# Patient Record
Sex: FEMALE | Race: WHITE | NOT HISPANIC OR LATINO | Employment: FULL TIME | URBAN - METROPOLITAN AREA
[De-identification: names, ages, dates, MRNs, and addresses within clinical notes are randomized per-mention and may not be internally consistent; named-entity substitution may affect disease eponyms.]

---

## 2019-04-24 LAB
ALT SERPL-CCNC: 46 U/L (ref 7–45)
AST SERPL-CCNC: 19 U/L
CHOLESTEROL (EXTERNAL): 121 MG/DL
HDLC SERPL-MCNC: 39 MG/DL (ref 40–59)
LDL CHOLESTEROL CALCULATED (EXTERNAL): 72 MG/DL (ref 0–130)
TRIGLYCERIDES (EXTERNAL): 49 MG/DL

## 2021-12-13 ENCOUNTER — TRANSFERRED RECORDS (OUTPATIENT)
Dept: HEALTH INFORMATION MANAGEMENT | Facility: CLINIC | Age: 21
End: 2021-12-13

## 2021-12-13 LAB
ALT SERPL-CCNC: 24 U/L (ref 7–45)
AST SERPL-CCNC: 23 U/L
CREATININE (EXTERNAL): 0.81 MG/DL (ref 0.5–1.39)
GFR ESTIMATED (EXTERNAL): >60 ML/MIN/1.73M2
GLUCOSE (EXTERNAL): 78 MG/DL (ref 60–199)
POTASSIUM (EXTERNAL): 4.2 MMOL/L (ref 3.6–5.1)

## 2022-01-01 ENCOUNTER — HOSPITAL ENCOUNTER (EMERGENCY)
Facility: CLINIC | Age: 22
End: 2022-01-01

## 2022-01-01 ENCOUNTER — APPOINTMENT (OUTPATIENT)
Dept: GENERAL RADIOLOGY | Facility: CLINIC | Age: 22
End: 2022-01-01
Attending: EMERGENCY MEDICINE

## 2022-01-01 ENCOUNTER — HOSPITAL ENCOUNTER (EMERGENCY)
Facility: CLINIC | Age: 22
Discharge: HOME | End: 2022-11-01
Attending: EMERGENCY MEDICINE | Admitting: EMERGENCY MEDICINE

## 2022-01-01 VITALS
OXYGEN SATURATION: 98 % | TEMPERATURE: 97.9 F | SYSTOLIC BLOOD PRESSURE: 155 MMHG | HEART RATE: 70 BPM | DIASTOLIC BLOOD PRESSURE: 94 MMHG | RESPIRATION RATE: 16 BRPM | BODY MASS INDEX: 29.16 KG/M2 | WEIGHT: 175 LBS | HEIGHT: 65 IN

## 2022-01-01 DIAGNOSIS — S89.91XA KNEE INJURY, RIGHT, INITIAL ENCOUNTER: ICD-10-CM

## 2022-01-01 DIAGNOSIS — S93.401A SPRAIN OF RIGHT ANKLE, UNSPECIFIED LIGAMENT, INITIAL ENCOUNTER: ICD-10-CM

## 2022-01-01 PROCEDURE — 73610 X-RAY EXAM OF ANKLE: CPT | Mod: RT

## 2022-01-01 PROCEDURE — 73562 X-RAY EXAM OF KNEE 3: CPT | Mod: RT

## 2022-01-01 PROCEDURE — 73630 X-RAY EXAM OF FOOT: CPT | Mod: RT

## 2022-01-01 PROCEDURE — 29505 APPLICATION LONG LEG SPLINT: CPT

## 2022-01-01 PROCEDURE — 99285 EMERGENCY DEPT VISIT HI MDM: CPT | Mod: 25

## 2022-01-01 ASSESSMENT — ENCOUNTER SYMPTOMS
COUGH: 0
FEVER: 0
SORE THROAT: 0

## 2022-11-01 NOTE — DISCHARGE INSTRUCTIONS
Wear knee immobilizer for 1 week. Use crutches to be help keep weight off this leg antil you follow up. Ice to knee/ankle 15 minutes every few hour. Tylenol and or ibuprofen for pain

## 2022-11-01 NOTE — ED PROVIDER NOTES
"  History   Chief Complaint:  Fall injury     HPI   Magy Garrett is a 22 year old male who presents with a fall injury. The patient reports that he was running in the woods last night when he fell into a hole and landed directly on his knees. He was intoxicated when this happens and did not notice the pain until this morning. He was having pain directly to his knee cap and the right side of his knee. He is also having right ankle pain and inner foot pain. He denies any other injuries from the fall. No fever, cough or sore throat. He just moved here from [place]. He quit smoking 3 days ago.    Review of Systems   Constitutional: Negative for fever.   HENT: Negative for sore throat.    Respiratory: Negative for cough.    Musculoskeletal:        + right ankle and knee pain   All other systems reviewed and are negative.      Allergies:  The patient has no known allergies.     Medications:  Testosterone     Past Medical History:     The patient denies past medical history.      Social History:  The patient presents to the ED alone  Living Situation: He just moved here from [place]  Alcohol Use: Endorses  Tobacco Use: Quit smoking 3 days ago   Drug Use: Endorses     Physical Exam     Patient Vitals for the past 24 hrs:   BP Temp Temp src Pulse Resp SpO2 Height Weight   11/01/22 1548 -- 97.9  F (36.6  C) Temporal -- -- -- -- --   11/01/22 1543 (!) 155/94 -- -- 70 16 98 % 1.651 m (5' 5\") 79.4 kg (175 lb)       Physical Exam    Physical Exam   Constitutional:  Patient is oriented to person, place, and time. They appear well-developed and well-nourished. Mild distress secondary to pain   HENT:   Mouth/Throat:   Wearing a face mask  Eyes:    Conjunctivae normal and EOM are normal. Pupils are equal, round, and reactive to light.   Neck:    Normal range of motion.   Cardiovascular: Normal rate, regular rhythm and normal heart sounds.  Exam reveals no gallop and no friction rub.  No murmur heard.  Pulmonary/Chest:  Effort " normal and breath sounds normal. Patient has no wheezes. Patient has no rales.   Musculoskeletal:  moderate right knee edema. No gross deformities. ACL and PCL are intact. Pain on the medial and lateral joint line to palpation. Painful ROM. Ankle pain on the right medial greater than lateral. No gross deformities. .   Neurological:   Patient is alert and oriented to person, place, and time. Patient has normal strength. No cranial nerve deficit or sensory deficit. GCS 15  Skin:   Skin is warm and dry. No rash noted. No erythema.   Psychiatric:   Patient has a normal mood and affect. Patient's behavior is normal. Judgment and thought content normal.         Emergency Department Course   Imaging:  Ankle XR, G/E 3 views, right   Final Result   IMPRESSION: Normal joint spaces and alignment. No fracture. The ankle mortise is congruent. The talar dome is unremarkable.      Foot  XR, G/E 3 views, right   Final Result   IMPRESSION: Normal joint spaces and alignment. No fracture.      XR Knee Right 3 Views   Final Result   IMPRESSION: Normal joint spaces and alignment. No fracture or joint effusion.        Report per radiology        Emergency Department Course:           Reviewed:  I reviewed nursing notes, vitals, past medical history and Care Everywhere    Assessments:  1549 I obtained history and examined the patient as noted above.   1706 I rechecked the patient and explained findings.     Interventions:  Knee immobilizer  Crutches     Disposition:  The patient was discharged to home.     Impression & Plan   Medical Decision Making:  Magy Beaulieu is a 22-year-old woman presenting to the emergency department with right knee and ankle foot injury after falling into a hole yesterday on Halloween.  He has moderate edema of the right knee but he does have normal flexion and extension.  He has pain to palpation around the knee joint itself but the ligament testing is intact.  I see no gross bony deformities of the knee foot  or ankle.  X-rays were obtained.  These are negative for fractures.  I suspect he probably had a meniscal injury to the knee.  I will place him in a knee immobilizer and crutches.  He most likely has an ankle sprain as well so the crutches will help alleviate the weight directly on this.  He will ice this every 3 hours and I will refer him back to primary care and Ortho for follow-up.          Diagnosis:    ICD-10-CM    1. Knee injury, right, initial encounter  S89.91XA       2. Sprain of right ankle, unspecified ligament, initial encounter  S93.401A           Discharge Medications:  There are no discharge medications for this patient.      Scribe Disclosure:  I, Malachi Laguerre, am serving as a scribe at 3:48 PM on 11/1/2022 to document services personally performed by Aidee Campbell MD based on my observations and the provider's statements to me.            Aidee Campbell MD  11/01/22 3168

## 2022-11-01 NOTE — ED TRIAGE NOTES
Pt running through the forest last night and fell and hit right knee and right foot. Pt present with unsteady gait with right leg.

## 2023-01-01 ENCOUNTER — OFFICE VISIT (OUTPATIENT)
Dept: FAMILY MEDICINE | Facility: CLINIC | Age: 23
End: 2023-01-01
Payer: MEDICAID

## 2023-01-01 ENCOUNTER — HOSPITAL ENCOUNTER (INPATIENT)
Facility: CLINIC | Age: 23
LOS: 10 days | Discharge: PSYCHIATRIC OR SUBSTANCE USE FACILITY | End: 2023-08-23
Attending: EMERGENCY MEDICINE | Admitting: INTERNAL MEDICINE
Payer: COMMERCIAL

## 2023-01-01 ENCOUNTER — APPOINTMENT (OUTPATIENT)
Dept: CARDIOLOGY | Facility: CLINIC | Age: 23
End: 2023-01-01
Attending: INTERNAL MEDICINE
Payer: COMMERCIAL

## 2023-01-01 ENCOUNTER — PATIENT OUTREACH (OUTPATIENT)
Dept: CARE COORDINATION | Facility: CLINIC | Age: 23
End: 2023-01-01
Payer: COMMERCIAL

## 2023-01-01 ENCOUNTER — HOSPITAL ENCOUNTER (EMERGENCY)
Facility: CLINIC | Age: 23
Discharge: HOME | End: 2023-06-10
Attending: EMERGENCY MEDICINE | Admitting: EMERGENCY MEDICINE
Payer: MEDICAID

## 2023-01-01 ENCOUNTER — APPOINTMENT (OUTPATIENT)
Dept: GENERAL RADIOLOGY | Facility: CLINIC | Age: 23
End: 2023-01-01
Payer: COMMERCIAL

## 2023-01-01 ENCOUNTER — HOSPITAL ENCOUNTER (OUTPATIENT)
Dept: NEUROLOGY | Facility: CLINIC | Age: 23
Setting detail: OBSERVATION
Discharge: HOME | End: 2023-08-12
Attending: INTERNAL MEDICINE
Payer: COMMERCIAL

## 2023-01-01 ENCOUNTER — APPOINTMENT (OUTPATIENT)
Dept: GENERAL RADIOLOGY | Facility: CLINIC | Age: 23
End: 2023-01-01
Attending: EMERGENCY MEDICINE
Payer: COMMERCIAL

## 2023-01-01 VITALS
DIASTOLIC BLOOD PRESSURE: 57 MMHG | HEART RATE: 72 BPM | HEIGHT: 62 IN | BODY MASS INDEX: 27.6 KG/M2 | OXYGEN SATURATION: 97 % | WEIGHT: 150 LBS | TEMPERATURE: 98.5 F | SYSTOLIC BLOOD PRESSURE: 124 MMHG | RESPIRATION RATE: 16 BRPM

## 2023-01-01 VITALS
TEMPERATURE: 98.2 F | OXYGEN SATURATION: 97 % | SYSTOLIC BLOOD PRESSURE: 126 MMHG | HEIGHT: 62 IN | WEIGHT: 142 LBS | BODY MASS INDEX: 26.13 KG/M2 | RESPIRATION RATE: 16 BRPM | HEART RATE: 63 BPM | DIASTOLIC BLOOD PRESSURE: 78 MMHG

## 2023-01-01 VITALS
HEART RATE: 50 BPM | BODY MASS INDEX: 24.61 KG/M2 | SYSTOLIC BLOOD PRESSURE: 114 MMHG | DIASTOLIC BLOOD PRESSURE: 53 MMHG | TEMPERATURE: 98.3 F | HEIGHT: 63 IN | RESPIRATION RATE: 15 BRPM | OXYGEN SATURATION: 98 % | WEIGHT: 138.89 LBS

## 2023-01-01 DIAGNOSIS — S26.91XA CONTUSION OF HEART, INITIAL ENCOUNTER: ICD-10-CM

## 2023-01-01 DIAGNOSIS — F64.0 TRANSGENDER PERSON ON HORMONE THERAPY: ICD-10-CM

## 2023-01-01 DIAGNOSIS — F31.81 BIPOLAR 2 DISORDER (H): ICD-10-CM

## 2023-01-01 DIAGNOSIS — Z79.899 TRANSGENDER PERSON ON HORMONE THERAPY: ICD-10-CM

## 2023-01-01 DIAGNOSIS — R45.851 SUICIDAL IDEATION: ICD-10-CM

## 2023-01-01 DIAGNOSIS — F64.9 GENDER DYSPHORIA: Primary | ICD-10-CM

## 2023-01-01 DIAGNOSIS — R07.89 MUSCULOSKELETAL CHEST PAIN: Primary | ICD-10-CM

## 2023-01-01 DIAGNOSIS — R79.89 ELEVATED TROPONIN: ICD-10-CM

## 2023-01-01 DIAGNOSIS — T40.604A OPIATE OVERDOSE, UNDETERMINED INTENT, INITIAL ENCOUNTER (H): ICD-10-CM

## 2023-01-01 DIAGNOSIS — Z11.59 NEED FOR HEPATITIS C SCREENING TEST: ICD-10-CM

## 2023-01-01 DIAGNOSIS — F43.20 EMOTIONAL CRISIS: ICD-10-CM

## 2023-01-01 DIAGNOSIS — Z11.4 SCREENING FOR HIV (HUMAN IMMUNODEFICIENCY VIRUS): ICD-10-CM

## 2023-01-01 LAB
ALBUMIN SERPL BCG-MCNC: 3.9 G/DL (ref 3.5–5.2)
ALBUMIN SERPL BCG-MCNC: 4.4 G/DL (ref 3.5–5.2)
ALBUMIN SERPL BCG-MCNC: 4.7 G/DL (ref 3.5–5.2)
ALBUMIN SERPL BCG-MCNC: 4.7 G/DL (ref 3.5–5.2)
ALBUMIN SERPL BCG-MCNC: 4.9 G/DL (ref 3.5–5.2)
ALP SERPL-CCNC: 104 U/L (ref 35–129)
ALP SERPL-CCNC: 115 U/L (ref 40–129)
ALP SERPL-CCNC: 125 U/L (ref 35–129)
ALP SERPL-CCNC: 127 U/L (ref 35–129)
ALT SERPL W P-5'-P-CCNC: 20 U/L (ref 0–70)
ALT SERPL W P-5'-P-CCNC: 26 U/L (ref 0–70)
ALT SERPL W P-5'-P-CCNC: 27 U/L (ref 0–70)
ALT SERPL W P-5'-P-CCNC: 29 U/L (ref 0–70)
AMPHETAMINES UR QL SCN: ABNORMAL
ANION GAP SERPL CALCULATED.3IONS-SCNC: 11 MMOL/L (ref 7–15)
ANION GAP SERPL CALCULATED.3IONS-SCNC: 12 MMOL/L (ref 7–15)
ANION GAP SERPL CALCULATED.3IONS-SCNC: 12 MMOL/L (ref 7–15)
ANION GAP SERPL CALCULATED.3IONS-SCNC: 15 MMOL/L (ref 7–15)
APAP SERPL-MCNC: <5 UG/ML (ref 10–30)
AST SERPL W P-5'-P-CCNC: 19 U/L (ref 0–45)
AST SERPL W P-5'-P-CCNC: 23 U/L (ref 0–45)
AST SERPL W P-5'-P-CCNC: 25 U/L (ref 0–45)
AST SERPL W P-5'-P-CCNC: 31 U/L (ref 0–45)
ATRIAL RATE - MUSE: 47 BPM
ATRIAL RATE - MUSE: 59 BPM
BARBITURATES UR QL SCN: ABNORMAL
BASE EXCESS BLDV CALC-SCNC: -5.3 MMOL/L (ref -7.7–1.9)
BASOPHILS # BLD AUTO: 0 10E3/UL (ref 0–0.2)
BASOPHILS # BLD AUTO: 0 10E3/UL (ref 0–0.2)
BASOPHILS # BLD AUTO: 0.1 10E3/UL (ref 0–0.2)
BASOPHILS NFR BLD AUTO: 0 %
BASOPHILS NFR BLD AUTO: 1 %
BASOPHILS NFR BLD AUTO: 1 %
BENZODIAZ UR QL SCN: ABNORMAL
BILIRUB DIRECT SERPL-MCNC: <0.2 MG/DL (ref 0–0.3)
BILIRUB SERPL-MCNC: 0.3 MG/DL
BILIRUB SERPL-MCNC: 0.4 MG/DL
BILIRUB SERPL-MCNC: 0.6 MG/DL
BILIRUB SERPL-MCNC: 0.8 MG/DL
BUN SERPL-MCNC: 10.5 MG/DL (ref 6–20)
BUN SERPL-MCNC: 11.4 MG/DL (ref 6–20)
BUN SERPL-MCNC: 12.4 MG/DL (ref 6–20)
BUN SERPL-MCNC: 13.5 MG/DL (ref 6–20)
BZE UR QL SCN: ABNORMAL
CALCIUM SERPL-MCNC: 8.8 MG/DL (ref 8.6–10)
CALCIUM SERPL-MCNC: 9.3 MG/DL (ref 8.6–10)
CALCIUM SERPL-MCNC: 9.3 MG/DL (ref 8.6–10)
CALCIUM SERPL-MCNC: 9.4 MG/DL (ref 8.6–10)
CANNABINOIDS UR QL SCN: ABNORMAL
CHLORIDE SERPL-SCNC: 105 MMOL/L (ref 98–107)
CHLORIDE SERPL-SCNC: 106 MMOL/L (ref 98–107)
CHLORIDE SERPL-SCNC: 109 MMOL/L (ref 98–107)
CHLORIDE SERPL-SCNC: 110 MMOL/L (ref 98–107)
CHOLEST SERPL-MCNC: 151 MG/DL
CREAT SERPL-MCNC: 1.06 MG/DL (ref 0.51–1.17)
CREAT SERPL-MCNC: 1.08 MG/DL (ref 0.51–1.17)
CREAT SERPL-MCNC: 1.08 MG/DL (ref 0.51–1.17)
CREAT SERPL-MCNC: 1.24 MG/DL (ref 0.51–1.17)
DEPRECATED HCO3 PLAS-SCNC: 18 MMOL/L (ref 22–29)
DEPRECATED HCO3 PLAS-SCNC: 20 MMOL/L (ref 22–29)
DEPRECATED HCO3 PLAS-SCNC: 21 MMOL/L (ref 22–29)
DEPRECATED HCO3 PLAS-SCNC: 23 MMOL/L (ref 22–29)
DIASTOLIC BLOOD PRESSURE - MUSE: NORMAL MMHG
DIASTOLIC BLOOD PRESSURE - MUSE: NORMAL MMHG
EOSINOPHIL # BLD AUTO: 0.1 10E3/UL (ref 0–0.7)
EOSINOPHIL # BLD AUTO: 0.2 10E3/UL (ref 0–0.7)
EOSINOPHIL # BLD AUTO: 0.2 10E3/UL (ref 0–0.7)
EOSINOPHIL NFR BLD AUTO: 1 %
EOSINOPHIL NFR BLD AUTO: 2 %
EOSINOPHIL NFR BLD AUTO: 2 %
ERYTHROCYTE [DISTWIDTH] IN BLOOD BY AUTOMATED COUNT: 15 % (ref 10–15)
ERYTHROCYTE [DISTWIDTH] IN BLOOD BY AUTOMATED COUNT: 15 % (ref 10–15)
ERYTHROCYTE [DISTWIDTH] IN BLOOD BY AUTOMATED COUNT: 15.2 % (ref 10–15)
ERYTHROCYTE [DISTWIDTH] IN BLOOD BY AUTOMATED COUNT: 15.3 % (ref 10–15)
ERYTHROCYTE [DISTWIDTH] IN BLOOD BY AUTOMATED COUNT: 15.4 % (ref 10–15)
ETHANOL SERPL-MCNC: <0.01 G/DL
FASTING STATUS PATIENT QL REPORTED: NORMAL
GFR SERPL CREATININE-BSD FRML MDRD: 62 ML/MIN/1.73M2
GFR SERPL CREATININE-BSD FRML MDRD: 73 ML/MIN/1.73M2
GFR SERPL CREATININE-BSD FRML MDRD: 73 ML/MIN/1.73M2
GFR SERPL CREATININE-BSD FRML MDRD: 74 ML/MIN/1.73M2
GLUCOSE BLDC GLUCOMTR-MCNC: 114 MG/DL (ref 70–99)
GLUCOSE BLDC GLUCOMTR-MCNC: 91 MG/DL (ref 70–99)
GLUCOSE BLDC GLUCOMTR-MCNC: 92 MG/DL (ref 70–99)
GLUCOSE BLDC GLUCOMTR-MCNC: 92 MG/DL (ref 70–99)
GLUCOSE SERPL-MCNC: 118 MG/DL (ref 70–99)
GLUCOSE SERPL-MCNC: 148 MG/DL (ref 70–99)
GLUCOSE SERPL-MCNC: 82 MG/DL (ref 70–99)
GLUCOSE SERPL-MCNC: 83 MG/DL (ref 70–99)
GLUCOSE SERPL-MCNC: 90 MG/DL (ref 70–99)
GLUCOSE SERPL-MCNC: 98 MG/DL (ref 70–99)
HCO3 BLDV-SCNC: 19 MMOL/L (ref 21–28)
HCT VFR BLD AUTO: 45.7 % (ref 35–53)
HCT VFR BLD AUTO: 48.5 % (ref 35–53)
HCT VFR BLD AUTO: 48.6 % (ref 35–53)
HCT VFR BLD AUTO: 49.3 % (ref 40–53)
HCT VFR BLD AUTO: 53.1 % (ref 35–53)
HCV AB SERPL QL IA: NONREACTIVE
HDLC SERPL-MCNC: 26 MG/DL
HGB BLD-MCNC: 15.2 G/DL (ref 11.7–17.7)
HGB BLD-MCNC: 16.2 G/DL (ref 11.7–17.7)
HGB BLD-MCNC: 16.2 G/DL (ref 13.3–17.7)
HGB BLD-MCNC: 16.3 G/DL (ref 11.7–17.7)
HGB BLD-MCNC: 17.8 G/DL (ref 11.7–17.7)
HIV 1+2 AB+HIV1 P24 AG SERPL QL IA: NONREACTIVE
HOLD SPECIMEN: NORMAL
IMM GRANULOCYTES # BLD: 0 10E3/UL
IMM GRANULOCYTES NFR BLD: 0 %
INTERPRETATION ECG - MUSE: NORMAL
INTERPRETATION ECG - MUSE: NORMAL
LAMOTRIGINE SERPL-MCNC: 2 UG/ML
LDLC SERPL CALC-MCNC: 98 MG/DL
LYMPHOCYTES # BLD AUTO: 1.1 10E3/UL (ref 0.8–5.3)
LYMPHOCYTES # BLD AUTO: 1.8 10E3/UL (ref 0.8–5.3)
LYMPHOCYTES # BLD AUTO: 2.5 10E3/UL (ref 0.8–5.3)
LYMPHOCYTES NFR BLD AUTO: 11 %
LYMPHOCYTES NFR BLD AUTO: 21 %
LYMPHOCYTES NFR BLD AUTO: 23 %
MAGNESIUM SERPL-MCNC: 2 MG/DL (ref 1.7–2.3)
MCH RBC QN AUTO: 29.9 PG (ref 26.5–33)
MCH RBC QN AUTO: 30.5 PG (ref 26.5–33)
MCH RBC QN AUTO: 30.8 PG (ref 26.5–33)
MCH RBC QN AUTO: 30.8 PG (ref 26.5–33)
MCH RBC QN AUTO: 31.2 PG (ref 26.5–33)
MCHC RBC AUTO-ENTMCNC: 32.9 G/DL (ref 31.5–36.5)
MCHC RBC AUTO-ENTMCNC: 33.3 G/DL (ref 31.5–36.5)
MCHC RBC AUTO-ENTMCNC: 33.3 G/DL (ref 31.5–36.5)
MCHC RBC AUTO-ENTMCNC: 33.5 G/DL (ref 31.5–36.5)
MCHC RBC AUTO-ENTMCNC: 33.6 G/DL (ref 31.5–36.5)
MCV RBC AUTO: 91 FL (ref 78–100)
MCV RBC AUTO: 92 FL (ref 78–100)
MCV RBC AUTO: 92 FL (ref 78–100)
MCV RBC AUTO: 93 FL (ref 78–100)
MCV RBC AUTO: 93 FL (ref 78–100)
MONOCYTES # BLD AUTO: 0.5 10E3/UL (ref 0–1.3)
MONOCYTES # BLD AUTO: 0.6 10E3/UL (ref 0–1.3)
MONOCYTES # BLD AUTO: 0.7 10E3/UL (ref 0–1.3)
MONOCYTES NFR BLD AUTO: 5 %
MONOCYTES NFR BLD AUTO: 6 %
MONOCYTES NFR BLD AUTO: 7 %
NEUTROPHILS # BLD AUTO: 5.8 10E3/UL (ref 1.6–8.3)
NEUTROPHILS # BLD AUTO: 7.2 10E3/UL (ref 1.6–8.3)
NEUTROPHILS # BLD AUTO: 8.7 10E3/UL (ref 1.6–8.3)
NEUTROPHILS NFR BLD AUTO: 67 %
NEUTROPHILS NFR BLD AUTO: 70 %
NEUTROPHILS NFR BLD AUTO: 82 %
NONHDLC SERPL-MCNC: 125 MG/DL
NRBC # BLD AUTO: 0 10E3/UL
NRBC # BLD AUTO: 0 10E3/UL
NRBC BLD AUTO-RTO: 0 /100
NRBC BLD AUTO-RTO: 0 /100
O2/TOTAL GAS SETTING VFR VENT: 0 %
OPIATES UR QL SCN: ABNORMAL
P AXIS - MUSE: 56 DEGREES
P AXIS - MUSE: 65 DEGREES
PCO2 BLDV: 33 MM HG (ref 40–50)
PCP QUAL URINE (ROCHE): ABNORMAL
PH BLDV: 7.37 [PH] (ref 7.32–7.43)
PHOSPHATE SERPL-MCNC: 1.9 MG/DL (ref 2.5–4.5)
PHOSPHATE SERPL-MCNC: 2 MG/DL (ref 2.5–4.5)
PHOSPHATE SERPL-MCNC: 2 MG/DL (ref 2.5–4.5)
PHOSPHATE SERPL-MCNC: 2.1 MG/DL (ref 2.5–4.5)
PHOSPHATE SERPL-MCNC: 2.2 MG/DL (ref 2.5–4.5)
PHOSPHATE SERPL-MCNC: 2.6 MG/DL (ref 2.5–4.5)
PHOSPHATE SERPL-MCNC: 2.7 MG/DL (ref 2.5–4.5)
PHOSPHATE SERPL-MCNC: 3.9 MG/DL (ref 2.5–4.5)
PLATELET # BLD AUTO: 304 10E3/UL (ref 150–450)
PLATELET # BLD AUTO: 317 10E3/UL (ref 150–450)
PLATELET # BLD AUTO: 333 10E3/UL (ref 150–450)
PLATELET # BLD AUTO: 362 10E3/UL (ref 150–450)
PLATELET # BLD AUTO: 433 10E3/UL (ref 150–450)
PO2 BLDV: 87 MM HG (ref 25–47)
POTASSIUM SERPL-SCNC: 3.5 MMOL/L (ref 3.4–5.3)
POTASSIUM SERPL-SCNC: 4 MMOL/L (ref 3.4–5.3)
POTASSIUM SERPL-SCNC: 4 MMOL/L (ref 3.4–5.3)
POTASSIUM SERPL-SCNC: 4.2 MMOL/L (ref 3.4–5.3)
PR INTERVAL - MUSE: 154 MS
PR INTERVAL - MUSE: 166 MS
PROT SERPL-MCNC: 6.3 G/DL (ref 6.4–8.3)
PROT SERPL-MCNC: 7.5 G/DL (ref 6.4–8.3)
PROT SERPL-MCNC: 7.8 G/DL (ref 6.4–8.3)
PROT SERPL-MCNC: 8 G/DL (ref 6.4–8.3)
QRS DURATION - MUSE: 102 MS
QRS DURATION - MUSE: 96 MS
QT - MUSE: 372 MS
QT - MUSE: 412 MS
QTC - MUSE: 364 MS
QTC - MUSE: 368 MS
R AXIS - MUSE: 69 DEGREES
R AXIS - MUSE: 73 DEGREES
RBC # BLD AUTO: 4.94 10E6/UL (ref 3.8–5.9)
RBC # BLD AUTO: 5.3 10E6/UL (ref 3.8–5.9)
RBC # BLD AUTO: 5.31 10E6/UL (ref 3.8–5.9)
RBC # BLD AUTO: 5.41 10E6/UL (ref 4.4–5.9)
RBC # BLD AUTO: 5.7 10E6/UL (ref 3.8–5.9)
SALICYLATES SERPL-MCNC: <0.3 MG/DL
SARS-COV-2 RNA RESP QL NAA+PROBE: NEGATIVE
SODIUM SERPL-SCNC: 140 MMOL/L (ref 136–145)
SODIUM SERPL-SCNC: 140 MMOL/L (ref 136–145)
SODIUM SERPL-SCNC: 141 MMOL/L (ref 136–145)
SODIUM SERPL-SCNC: 141 MMOL/L (ref 136–145)
SYSTOLIC BLOOD PRESSURE - MUSE: NORMAL MMHG
SYSTOLIC BLOOD PRESSURE - MUSE: NORMAL MMHG
T AXIS - MUSE: 49 DEGREES
T AXIS - MUSE: 60 DEGREES
TESTOST SERPL-MCNC: 3211 NG/DL (ref 240–950)
TOPIRAMATE SERPL-MCNC: 5.6 UG/ML
TOPIRAMATE SERPL-MCNC: 6.4 UG/ML
TRIGL SERPL-MCNC: 134 MG/DL
TROPONIN T SERPL HS-MCNC: 11 NG/L
TROPONIN T SERPL HS-MCNC: 19 NG/L
TROPONIN T SERPL HS-MCNC: 46 NG/L
TROPONIN T SERPL HS-MCNC: 68 NG/L
VENTRICULAR RATE- MUSE: 47 BPM
VENTRICULAR RATE- MUSE: 59 BPM
WBC # BLD AUTO: 10.5 10E3/UL (ref 4–11)
WBC # BLD AUTO: 10.7 10E3/UL (ref 4–11)
WBC # BLD AUTO: 11.1 10E3/UL (ref 4–11)
WBC # BLD AUTO: 7.7 10E3/UL (ref 4–11)
WBC # BLD AUTO: 8.3 10E3/UL (ref 4–11)

## 2023-01-01 PROCEDURE — 250N000013 HC RX MED GY IP 250 OP 250 PS 637: Performed by: INTERNAL MEDICINE

## 2023-01-01 PROCEDURE — 999N000052 EEG VIDEO 2-12 HRS UNMONITORED

## 2023-01-01 PROCEDURE — 71046 X-RAY EXAM CHEST 2 VIEWS: CPT

## 2023-01-01 PROCEDURE — 84403 ASSAY OF TOTAL TESTOSTERONE: CPT | Mod: KX | Performed by: STUDENT IN AN ORGANIZED HEALTH CARE EDUCATION/TRAINING PROGRAM

## 2023-01-01 PROCEDURE — 36415 COLL VENOUS BLD VENIPUNCTURE: CPT | Performed by: HOSPITALIST

## 2023-01-01 PROCEDURE — 99285 EMERGENCY DEPT VISIT HI MDM: CPT | Mod: 25

## 2023-01-01 PROCEDURE — 84484 ASSAY OF TROPONIN QUANT: CPT | Performed by: EMERGENCY MEDICINE

## 2023-01-01 PROCEDURE — 84100 ASSAY OF PHOSPHORUS: CPT | Performed by: HOSPITALIST

## 2023-01-01 PROCEDURE — 96375 TX/PRO/DX INJ NEW DRUG ADDON: CPT

## 2023-01-01 PROCEDURE — G0378 HOSPITAL OBSERVATION PER HR: HCPCS

## 2023-01-01 PROCEDURE — 250N000011 HC RX IP 250 OP 636: Mod: JZ | Performed by: HOSPITALIST

## 2023-01-01 PROCEDURE — 82947 ASSAY GLUCOSE BLOOD QUANT: CPT | Mod: KX | Performed by: STUDENT IN AN ORGANIZED HEALTH CARE EDUCATION/TRAINING PROGRAM

## 2023-01-01 PROCEDURE — 120N000001 HC R&B MED SURG/OB

## 2023-01-01 PROCEDURE — 250N000013 HC RX MED GY IP 250 OP 250 PS 637: Performed by: HOSPITALIST

## 2023-01-01 PROCEDURE — 36415 COLL VENOUS BLD VENIPUNCTURE: CPT | Performed by: PSYCHIATRY & NEUROLOGY

## 2023-01-01 PROCEDURE — 96361 HYDRATE IV INFUSION ADD-ON: CPT

## 2023-01-01 PROCEDURE — 80143 DRUG ASSAY ACETAMINOPHEN: CPT | Performed by: EMERGENCY MEDICINE

## 2023-01-01 PROCEDURE — 250N000011 HC RX IP 250 OP 636: Performed by: EMERGENCY MEDICINE

## 2023-01-01 PROCEDURE — H0001 ALCOHOL AND/OR DRUG ASSESS: HCPCS

## 2023-01-01 PROCEDURE — 80053 COMPREHEN METABOLIC PANEL: CPT | Performed by: HOSPITALIST

## 2023-01-01 PROCEDURE — 250N000011 HC RX IP 250 OP 636: Performed by: INTERNAL MEDICINE

## 2023-01-01 PROCEDURE — 99232 SBSQ HOSP IP/OBS MODERATE 35: CPT | Performed by: INTERNAL MEDICINE

## 2023-01-01 PROCEDURE — 82962 GLUCOSE BLOOD TEST: CPT

## 2023-01-01 PROCEDURE — 96376 TX/PRO/DX INJ SAME DRUG ADON: CPT

## 2023-01-01 PROCEDURE — 99291 CRITICAL CARE FIRST HOUR: CPT | Performed by: INTERNAL MEDICINE

## 2023-01-01 PROCEDURE — 99239 HOSP IP/OBS DSCHRG MGMT >30: CPT | Performed by: INTERNAL MEDICINE

## 2023-01-01 PROCEDURE — 82947 ASSAY GLUCOSE BLOOD QUANT: CPT | Performed by: INTERNAL MEDICINE

## 2023-01-01 PROCEDURE — 80201 ASSAY OF TOPIRAMATE: CPT | Performed by: INTERNAL MEDICINE

## 2023-01-01 PROCEDURE — 90791 PSYCH DIAGNOSTIC EVALUATION: CPT

## 2023-01-01 PROCEDURE — 87635 SARS-COV-2 COVID-19 AMP PRB: CPT | Performed by: EMERGENCY MEDICINE

## 2023-01-01 PROCEDURE — 80069 RENAL FUNCTION PANEL: CPT | Performed by: INTERNAL MEDICINE

## 2023-01-01 PROCEDURE — 93306 TTE W/DOPPLER COMPLETE: CPT | Mod: 26 | Performed by: INTERNAL MEDICINE

## 2023-01-01 PROCEDURE — 84100 ASSAY OF PHOSPHORUS: CPT | Performed by: INTERNAL MEDICINE

## 2023-01-01 PROCEDURE — 250N000011 HC RX IP 250 OP 636: Mod: JZ | Performed by: INTERNAL MEDICINE

## 2023-01-01 PROCEDURE — 99204 OFFICE O/P NEW MOD 45 MIN: CPT | Mod: GC | Performed by: STUDENT IN AN ORGANIZED HEALTH CARE EDUCATION/TRAINING PROGRAM

## 2023-01-01 PROCEDURE — 84484 ASSAY OF TROPONIN QUANT: CPT

## 2023-01-01 PROCEDURE — 99232 SBSQ HOSP IP/OBS MODERATE 35: CPT | Performed by: HOSPITALIST

## 2023-01-01 PROCEDURE — 258N000003 HC RX IP 258 OP 636: Performed by: EMERGENCY MEDICINE

## 2023-01-01 PROCEDURE — 93010 ELECTROCARDIOGRAM REPORT: CPT | Performed by: INTERNAL MEDICINE

## 2023-01-01 PROCEDURE — 80053 COMPREHEN METABOLIC PANEL: CPT | Performed by: EMERGENCY MEDICINE

## 2023-01-01 PROCEDURE — 93306 TTE W/DOPPLER COMPLETE: CPT

## 2023-01-01 PROCEDURE — 99223 1ST HOSP IP/OBS HIGH 75: CPT | Mod: AI | Performed by: INTERNAL MEDICINE

## 2023-01-01 PROCEDURE — 99254 IP/OBS CNSLTJ NEW/EST MOD 60: CPT | Performed by: PSYCHIATRY & NEUROLOGY

## 2023-01-01 PROCEDURE — 250N000009 HC RX 250: Performed by: HOSPITALIST

## 2023-01-01 PROCEDURE — 85027 COMPLETE CBC AUTOMATED: CPT | Performed by: HOSPITALIST

## 2023-01-01 PROCEDURE — 85025 COMPLETE CBC W/AUTO DIFF WBC: CPT | Performed by: EMERGENCY MEDICINE

## 2023-01-01 PROCEDURE — 36415 COLL VENOUS BLD VENIPUNCTURE: CPT | Performed by: INTERNAL MEDICINE

## 2023-01-01 PROCEDURE — 36415 COLL VENOUS BLD VENIPUNCTURE: CPT

## 2023-01-01 PROCEDURE — 82077 ASSAY SPEC XCP UR&BREATH IA: CPT | Performed by: EMERGENCY MEDICINE

## 2023-01-01 PROCEDURE — 258N000003 HC RX IP 258 OP 636: Performed by: INTERNAL MEDICINE

## 2023-01-01 PROCEDURE — 99207 PR NO BILLABLE SERVICE THIS VISIT: CPT | Performed by: INTERNAL MEDICINE

## 2023-01-01 PROCEDURE — 250N000009 HC RX 250: Performed by: INTERNAL MEDICINE

## 2023-01-01 PROCEDURE — 85027 COMPLETE CBC AUTOMATED: CPT | Performed by: INTERNAL MEDICINE

## 2023-01-01 PROCEDURE — 80307 DRUG TEST PRSMV CHEM ANLYZR: CPT | Performed by: INTERNAL MEDICINE

## 2023-01-01 PROCEDURE — 99233 SBSQ HOSP IP/OBS HIGH 50: CPT | Performed by: INTERNAL MEDICINE

## 2023-01-01 PROCEDURE — 99233 SBSQ HOSP IP/OBS HIGH 50: CPT | Performed by: PSYCHIATRY & NEUROLOGY

## 2023-01-01 PROCEDURE — 36415 COLL VENOUS BLD VENIPUNCTURE: CPT | Performed by: EMERGENCY MEDICINE

## 2023-01-01 PROCEDURE — 85025 COMPLETE CBC W/AUTO DIFF WBC: CPT | Performed by: INTERNAL MEDICINE

## 2023-01-01 PROCEDURE — 80076 HEPATIC FUNCTION PANEL: CPT | Mod: KX | Performed by: STUDENT IN AN ORGANIZED HEALTH CARE EDUCATION/TRAINING PROGRAM

## 2023-01-01 PROCEDURE — 96374 THER/PROPH/DIAG INJ IV PUSH: CPT

## 2023-01-01 PROCEDURE — 84484 ASSAY OF TROPONIN QUANT: CPT | Performed by: INTERNAL MEDICINE

## 2023-01-01 PROCEDURE — 86803 HEPATITIS C AB TEST: CPT | Mod: KX | Performed by: STUDENT IN AN ORGANIZED HEALTH CARE EDUCATION/TRAINING PROGRAM

## 2023-01-01 PROCEDURE — 80175 DRUG SCREEN QUAN LAMOTRIGINE: CPT | Performed by: EMERGENCY MEDICINE

## 2023-01-01 PROCEDURE — 74018 RADEX ABDOMEN 1 VIEW: CPT

## 2023-01-01 PROCEDURE — 80201 ASSAY OF TOPIRAMATE: CPT | Performed by: PSYCHIATRY & NEUROLOGY

## 2023-01-01 PROCEDURE — 93005 ELECTROCARDIOGRAM TRACING: CPT

## 2023-01-01 PROCEDURE — 80179 DRUG ASSAY SALICYLATE: CPT | Performed by: EMERGENCY MEDICINE

## 2023-01-01 PROCEDURE — 80061 LIPID PANEL: CPT | Mod: KX | Performed by: STUDENT IN AN ORGANIZED HEALTH CARE EDUCATION/TRAINING PROGRAM

## 2023-01-01 PROCEDURE — 82803 BLOOD GASES ANY COMBINATION: CPT | Performed by: INTERNAL MEDICINE

## 2023-01-01 PROCEDURE — 83735 ASSAY OF MAGNESIUM: CPT

## 2023-01-01 PROCEDURE — 85025 COMPLETE CBC W/AUTO DIFF WBC: CPT | Performed by: STUDENT IN AN ORGANIZED HEALTH CARE EDUCATION/TRAINING PROGRAM

## 2023-01-01 PROCEDURE — 36415 COLL VENOUS BLD VENIPUNCTURE: CPT | Performed by: STUDENT IN AN ORGANIZED HEALTH CARE EDUCATION/TRAINING PROGRAM

## 2023-01-01 PROCEDURE — 250N000011 HC RX IP 250 OP 636: Mod: JZ | Performed by: EMERGENCY MEDICINE

## 2023-01-01 PROCEDURE — 87389 HIV-1 AG W/HIV-1&-2 AB AG IA: CPT | Mod: KX | Performed by: STUDENT IN AN ORGANIZED HEALTH CARE EDUCATION/TRAINING PROGRAM

## 2023-01-01 PROCEDURE — 80053 COMPREHEN METABOLIC PANEL: CPT | Performed by: INTERNAL MEDICINE

## 2023-01-01 RX ORDER — EMTRICITABINE AND TENOFOVIR DISOPROXIL FUMARATE 200; 300 MG/1; MG/1
1 TABLET, FILM COATED ORAL
Status: ON HOLD | COMMUNITY
Start: 2023-01-01 | End: 2023-01-01

## 2023-01-01 RX ORDER — LIDOCAINE 40 MG/G
CREAM TOPICAL
Status: DISCONTINUED | OUTPATIENT
Start: 2023-01-01 | End: 2023-01-01

## 2023-01-01 RX ORDER — ONDANSETRON 2 MG/ML
4 INJECTION INTRAMUSCULAR; INTRAVENOUS EVERY 30 MIN PRN
Status: DISCONTINUED | OUTPATIENT
Start: 2023-01-01 | End: 2023-01-01

## 2023-01-01 RX ORDER — TOPIRAMATE 50 MG/1
50-100 TABLET, FILM COATED ORAL 2 TIMES DAILY
Status: DISCONTINUED | OUTPATIENT
Start: 2023-01-01 | End: 2023-01-01

## 2023-01-01 RX ORDER — NALOXONE HYDROCHLORIDE 0.4 MG/ML
0.2 INJECTION, SOLUTION INTRAMUSCULAR; INTRAVENOUS; SUBCUTANEOUS
Status: DISCONTINUED | OUTPATIENT
Start: 2023-01-01 | End: 2023-01-01 | Stop reason: HOSPADM

## 2023-01-01 RX ORDER — TOPIRAMATE 100 MG/1
100 TABLET, FILM COATED ORAL EVERY EVENING
Status: DISCONTINUED | OUTPATIENT
Start: 2023-01-01 | End: 2023-01-01

## 2023-01-01 RX ORDER — NALOXONE HYDROCHLORIDE 0.4 MG/ML
0.4 INJECTION, SOLUTION INTRAMUSCULAR; INTRAVENOUS; SUBCUTANEOUS
Status: DISCONTINUED | OUTPATIENT
Start: 2023-01-01 | End: 2023-01-01 | Stop reason: HOSPADM

## 2023-01-01 RX ORDER — TOPIRAMATE 100 MG/1
100 TABLET, FILM COATED ORAL EVERY MORNING
Status: DISCONTINUED | OUTPATIENT
Start: 2023-01-01 | End: 2023-01-01

## 2023-01-01 RX ORDER — ACETAMINOPHEN 500 MG
1000 TABLET ORAL 3 TIMES DAILY
Qty: 180 TABLET | Refills: 0 | Status: SHIPPED | OUTPATIENT
Start: 2023-01-01

## 2023-01-01 RX ORDER — TESTOSTERONE CYPIONATE 200 MG/ML
0.12 INJECTION, SOLUTION INTRAMUSCULAR WEEKLY
Status: DISCONTINUED | OUTPATIENT
Start: 2023-01-01 | End: 2023-01-01 | Stop reason: HOSPADM

## 2023-01-01 RX ORDER — ACETAMINOPHEN 500 MG
1000 TABLET ORAL 3 TIMES DAILY
COMMUNITY
Start: 2023-01-01 | End: 2023-01-01

## 2023-01-01 RX ORDER — TESTOSTERONE CYPIONATE 1000 MG/10ML
0.25 INJECTION, SOLUTION INTRAMUSCULAR WEEKLY
Status: ON HOLD | COMMUNITY
End: 2023-01-01

## 2023-01-01 RX ORDER — EMTRICITABINE AND TENOFOVIR DISOPROXIL FUMARATE 200; 300 MG/1; MG/1
1 TABLET, FILM COATED ORAL
Qty: 30 TABLET | Refills: 1 | Status: SHIPPED | OUTPATIENT
Start: 2023-01-01

## 2023-01-01 RX ORDER — ACETAMINOPHEN 325 MG/1
650 TABLET ORAL EVERY 6 HOURS PRN
Status: DISCONTINUED | OUTPATIENT
Start: 2023-01-01 | End: 2023-01-01 | Stop reason: HOSPADM

## 2023-01-01 RX ORDER — HYDROXYZINE HYDROCHLORIDE 25 MG/1
25 TABLET, FILM COATED ORAL 3 TIMES DAILY PRN
COMMUNITY
End: 2023-01-01

## 2023-01-01 RX ORDER — AMOXICILLIN 250 MG
2 CAPSULE ORAL 2 TIMES DAILY PRN
Status: DISCONTINUED | OUTPATIENT
Start: 2023-01-01 | End: 2023-01-01 | Stop reason: HOSPADM

## 2023-01-01 RX ORDER — TOPIRAMATE 50 MG/1
50 TABLET, FILM COATED ORAL EVERY EVENING
Status: DISCONTINUED | OUTPATIENT
Start: 2023-01-01 | End: 2023-01-01

## 2023-01-01 RX ORDER — TESTOSTERONE CYPIONATE 1000 MG/10ML
25 INJECTION, SOLUTION INTRAMUSCULAR WEEKLY
Qty: 1 ML | Refills: 1 | Status: SHIPPED | OUTPATIENT
Start: 2023-01-01

## 2023-01-01 RX ORDER — PROCHLORPERAZINE 25 MG
25 SUPPOSITORY, RECTAL RECTAL EVERY 12 HOURS PRN
Status: DISCONTINUED | OUTPATIENT
Start: 2023-01-01 | End: 2023-01-01 | Stop reason: HOSPADM

## 2023-01-01 RX ORDER — LAMOTRIGINE 25 MG/1
TABLET ORAL
Status: ON HOLD | COMMUNITY
Start: 2023-01-01 | End: 2023-01-01

## 2023-01-01 RX ORDER — IBUPROFEN 600 MG/1
600 TABLET, FILM COATED ORAL EVERY 6 HOURS PRN
COMMUNITY
Start: 2023-01-01 | End: 2023-01-01

## 2023-01-01 RX ORDER — TOPIRAMATE 50 MG/1
TABLET, FILM COATED ORAL
Status: ON HOLD | COMMUNITY
End: 2023-01-01

## 2023-01-01 RX ORDER — TESTOSTERONE CYPIONATE 200 MG/ML
0.12 INJECTION, SOLUTION INTRAMUSCULAR WEEKLY
Status: DISCONTINUED | OUTPATIENT
Start: 2023-01-01 | End: 2023-01-01

## 2023-01-01 RX ORDER — ONDANSETRON 4 MG/1
4 TABLET, ORALLY DISINTEGRATING ORAL ONCE
Status: COMPLETED | OUTPATIENT
Start: 2023-01-01 | End: 2023-01-01

## 2023-01-01 RX ORDER — IBUPROFEN 600 MG/1
600 TABLET, FILM COATED ORAL EVERY 6 HOURS PRN
Qty: 120 TABLET | Refills: 0 | Status: SHIPPED | OUTPATIENT
Start: 2023-01-01

## 2023-01-01 RX ORDER — POLYETHYLENE GLYCOL 3350 17 G/17G
17 POWDER, FOR SOLUTION ORAL DAILY PRN
Status: DISCONTINUED | OUTPATIENT
Start: 2023-01-01 | End: 2023-01-01 | Stop reason: HOSPADM

## 2023-01-01 RX ORDER — IBUPROFEN 600 MG/1
600 TABLET, FILM COATED ORAL EVERY 6 HOURS PRN
Status: DISCONTINUED | OUTPATIENT
Start: 2023-01-01 | End: 2023-01-01 | Stop reason: HOSPADM

## 2023-01-01 RX ORDER — TOPIRAMATE 100 MG/1
100 TABLET, FILM COATED ORAL 2 TIMES DAILY
Status: DISCONTINUED | OUTPATIENT
Start: 2023-01-01 | End: 2023-01-01 | Stop reason: HOSPADM

## 2023-01-01 RX ORDER — GABAPENTIN 600 MG/1
600 TABLET ORAL 2 TIMES DAILY
COMMUNITY
End: 2023-01-01

## 2023-01-01 RX ORDER — KETOROLAC TROMETHAMINE 15 MG/ML
15 INJECTION, SOLUTION INTRAMUSCULAR; INTRAVENOUS ONCE
Status: COMPLETED | OUTPATIENT
Start: 2023-01-01 | End: 2023-01-01

## 2023-01-01 RX ORDER — ONDANSETRON 2 MG/ML
4 INJECTION INTRAMUSCULAR; INTRAVENOUS EVERY 6 HOURS PRN
Status: DISCONTINUED | OUTPATIENT
Start: 2023-01-01 | End: 2023-01-01 | Stop reason: HOSPADM

## 2023-01-01 RX ORDER — ACETAMINOPHEN 650 MG/1
650 SUPPOSITORY RECTAL EVERY 6 HOURS PRN
Status: DISCONTINUED | OUTPATIENT
Start: 2023-01-01 | End: 2023-01-01 | Stop reason: HOSPADM

## 2023-01-01 RX ORDER — PROCHLORPERAZINE MALEATE 10 MG
10 TABLET ORAL EVERY 6 HOURS PRN
Status: DISCONTINUED | OUTPATIENT
Start: 2023-01-01 | End: 2023-01-01 | Stop reason: HOSPADM

## 2023-01-01 RX ORDER — LIDOCAINE 4 G/G
2 PATCH TOPICAL
Status: DISCONTINUED | OUTPATIENT
Start: 2023-01-01 | End: 2023-01-01

## 2023-01-01 RX ORDER — ONDANSETRON 4 MG/1
4 TABLET, ORALLY DISINTEGRATING ORAL EVERY 6 HOURS PRN
Status: DISCONTINUED | OUTPATIENT
Start: 2023-01-01 | End: 2023-01-01 | Stop reason: HOSPADM

## 2023-01-01 RX ORDER — EMTRICITABINE AND TENOFOVIR DISOPROXIL FUMARATE 200; 300 MG/1; MG/1
1 TABLET, FILM COATED ORAL DAILY
Status: DISCONTINUED | OUTPATIENT
Start: 2023-01-01 | End: 2023-01-01 | Stop reason: HOSPADM

## 2023-01-01 RX ORDER — BISACODYL 10 MG
10 SUPPOSITORY, RECTAL RECTAL DAILY PRN
Status: DISCONTINUED | OUTPATIENT
Start: 2023-01-01 | End: 2023-01-01 | Stop reason: HOSPADM

## 2023-01-01 RX ORDER — ACETAMINOPHEN 500 MG
1000 TABLET ORAL 3 TIMES DAILY
Status: DISCONTINUED | OUTPATIENT
Start: 2023-01-01 | End: 2023-01-01 | Stop reason: HOSPADM

## 2023-01-01 RX ORDER — TOPIRAMATE 50 MG/1
100 TABLET, FILM COATED ORAL 2 TIMES DAILY
Qty: 60 TABLET | Refills: 1 | Status: SHIPPED | OUTPATIENT
Start: 2023-01-01

## 2023-01-01 RX ORDER — CALCIUM CARBONATE 500 MG/1
500 TABLET, CHEWABLE ORAL DAILY PRN
Status: DISCONTINUED | OUTPATIENT
Start: 2023-01-01 | End: 2023-01-01 | Stop reason: HOSPADM

## 2023-01-01 RX ORDER — AMOXICILLIN 250 MG
1 CAPSULE ORAL 2 TIMES DAILY PRN
Status: DISCONTINUED | OUTPATIENT
Start: 2023-01-01 | End: 2023-01-01 | Stop reason: HOSPADM

## 2023-01-01 RX ADMIN — TOPIRAMATE 100 MG: 100 TABLET, FILM COATED ORAL at 09:18

## 2023-01-01 RX ADMIN — ACETAMINOPHEN 1000 MG: 500 TABLET, FILM COATED ORAL at 22:07

## 2023-01-01 RX ADMIN — CALCIUM CARBONATE (ANTACID) CHEW TAB 500 MG 500 MG: 500 CHEW TAB at 23:57

## 2023-01-01 RX ADMIN — ACETAMINOPHEN 1000 MG: 500 TABLET, FILM COATED ORAL at 08:17

## 2023-01-01 RX ADMIN — IBUPROFEN 600 MG: 600 TABLET ORAL at 10:27

## 2023-01-01 RX ADMIN — TESTOSTERONE CYPIONATE 25 MG: 200 INJECTION, SOLUTION INTRAMUSCULAR at 18:36

## 2023-01-01 RX ADMIN — POTASSIUM & SODIUM PHOSPHATES POWDER PACK 280-160-250 MG 1 PACKET: 280-160-250 PACK at 00:29

## 2023-01-01 RX ADMIN — TOPIRAMATE 100 MG: 100 TABLET, FILM COATED ORAL at 22:03

## 2023-01-01 RX ADMIN — TOPIRAMATE 100 MG: 100 TABLET, FILM COATED ORAL at 09:48

## 2023-01-01 RX ADMIN — ACETAMINOPHEN 1000 MG: 500 TABLET, FILM COATED ORAL at 08:48

## 2023-01-01 RX ADMIN — ACETAMINOPHEN 1000 MG: 500 TABLET, FILM COATED ORAL at 17:39

## 2023-01-01 RX ADMIN — POTASSIUM & SODIUM PHOSPHATES POWDER PACK 280-160-250 MG 1 PACKET: 280-160-250 PACK at 20:09

## 2023-01-01 RX ADMIN — POTASSIUM & SODIUM PHOSPHATES POWDER PACK 280-160-250 MG 1 PACKET: 280-160-250 PACK at 21:32

## 2023-01-01 RX ADMIN — ACETAMINOPHEN 1000 MG: 500 TABLET, FILM COATED ORAL at 17:30

## 2023-01-01 RX ADMIN — TOPIRAMATE 100 MG: 100 TABLET, FILM COATED ORAL at 22:26

## 2023-01-01 RX ADMIN — EMTRICITABINE AND TENOFOVIR DISOPROXIL FUMARATE 1 TABLET: 200; 300 TABLET, FILM COATED ORAL at 17:30

## 2023-01-01 RX ADMIN — ACETAMINOPHEN 1000 MG: 500 TABLET, FILM COATED ORAL at 22:47

## 2023-01-01 RX ADMIN — DICLOFENAC SODIUM 2 G: 10 GEL TOPICAL at 22:06

## 2023-01-01 RX ADMIN — DICLOFENAC SODIUM 2 G: 10 GEL TOPICAL at 21:20

## 2023-01-01 RX ADMIN — TOPIRAMATE 100 MG: 100 TABLET, FILM COATED ORAL at 20:50

## 2023-01-01 RX ADMIN — DICLOFENAC SODIUM 2 G: 10 GEL TOPICAL at 13:10

## 2023-01-01 RX ADMIN — ACETAMINOPHEN 1000 MG: 500 TABLET, FILM COATED ORAL at 20:49

## 2023-01-01 RX ADMIN — EMTRICITABINE AND TENOFOVIR DISOPROXIL FUMARATE 1 TABLET: 200; 300 TABLET, FILM COATED ORAL at 15:48

## 2023-01-01 RX ADMIN — ONDANSETRON 4 MG: 4 TABLET, ORALLY DISINTEGRATING ORAL at 13:57

## 2023-01-01 RX ADMIN — ACETAMINOPHEN 650 MG: 325 TABLET, FILM COATED ORAL at 17:22

## 2023-01-01 RX ADMIN — IBUPROFEN 600 MG: 600 TABLET ORAL at 23:57

## 2023-01-01 RX ADMIN — DICLOFENAC SODIUM 2 G: 10 GEL TOPICAL at 09:31

## 2023-01-01 RX ADMIN — POTASSIUM & SODIUM PHOSPHATES POWDER PACK 280-160-250 MG 1 PACKET: 280-160-250 PACK at 10:33

## 2023-01-01 RX ADMIN — CALCIUM CARBONATE (ANTACID) CHEW TAB 500 MG 500 MG: 500 CHEW TAB at 23:53

## 2023-01-01 RX ADMIN — DICLOFENAC SODIUM 2 G: 10 GEL TOPICAL at 07:47

## 2023-01-01 RX ADMIN — Medication: at 09:44

## 2023-01-01 RX ADMIN — TESTOSTERONE CYPIONATE 25 MG: 200 INJECTION, SOLUTION INTRAMUSCULAR at 14:57

## 2023-01-01 RX ADMIN — ACETAMINOPHEN 1000 MG: 500 TABLET, FILM COATED ORAL at 22:03

## 2023-01-01 RX ADMIN — NALOXONE HYDROCHLORIDE 0.4 MG: 0.4 INJECTION, SOLUTION INTRAMUSCULAR; INTRAVENOUS; SUBCUTANEOUS at 02:46

## 2023-01-01 RX ADMIN — TOPIRAMATE 100 MG: 100 TABLET, FILM COATED ORAL at 20:09

## 2023-01-01 RX ADMIN — TOPIRAMATE 100 MG: 100 TABLET, FILM COATED ORAL at 08:34

## 2023-01-01 RX ADMIN — EMTRICITABINE AND TENOFOVIR DISOPROXIL FUMARATE 1 TABLET: 200; 300 TABLET, FILM COATED ORAL at 16:17

## 2023-01-01 RX ADMIN — DICLOFENAC SODIUM 2 G: 10 GEL TOPICAL at 07:59

## 2023-01-01 RX ADMIN — TOPIRAMATE 100 MG: 100 TABLET, FILM COATED ORAL at 20:39

## 2023-01-01 RX ADMIN — ACETAMINOPHEN 1000 MG: 500 TABLET, FILM COATED ORAL at 17:17

## 2023-01-01 RX ADMIN — ACETAMINOPHEN 1000 MG: 500 TABLET, FILM COATED ORAL at 16:22

## 2023-01-01 RX ADMIN — LIDOCAINE 2 PATCH: 560 PATCH PERCUTANEOUS; TOPICAL; TRANSDERMAL at 08:34

## 2023-01-01 RX ADMIN — ACETAMINOPHEN 1000 MG: 500 TABLET, FILM COATED ORAL at 16:16

## 2023-01-01 RX ADMIN — NALOXONE HYDROCHLORIDE 0.6 MG/HR: 1 INJECTION PARENTERAL at 07:48

## 2023-01-01 RX ADMIN — ACETAMINOPHEN 1000 MG: 500 TABLET, FILM COATED ORAL at 20:55

## 2023-01-01 RX ADMIN — ACETAMINOPHEN 1000 MG: 500 TABLET, FILM COATED ORAL at 10:27

## 2023-01-01 RX ADMIN — POTASSIUM & SODIUM PHOSPHATES POWDER PACK 280-160-250 MG 2 PACKET: 280-160-250 PACK at 16:17

## 2023-01-01 RX ADMIN — ACETAMINOPHEN 1000 MG: 500 TABLET, FILM COATED ORAL at 09:30

## 2023-01-01 RX ADMIN — LIDOCAINE 2 PATCH: 560 PATCH PERCUTANEOUS; TOPICAL; TRANSDERMAL at 09:44

## 2023-01-01 RX ADMIN — EMTRICITABINE AND TENOFOVIR DISOPROXIL FUMARATE 1 TABLET: 200; 300 TABLET, FILM COATED ORAL at 16:24

## 2023-01-01 RX ADMIN — POTASSIUM & SODIUM PHOSPHATES POWDER PACK 280-160-250 MG 2 PACKET: 280-160-250 PACK at 11:44

## 2023-01-01 RX ADMIN — Medication: at 08:53

## 2023-01-01 RX ADMIN — SENNOSIDES AND DOCUSATE SODIUM 2 TABLET: 50; 8.6 TABLET ORAL at 17:17

## 2023-01-01 RX ADMIN — LIDOCAINE 2 PATCH: 560 PATCH PERCUTANEOUS; TOPICAL; TRANSDERMAL at 09:22

## 2023-01-01 RX ADMIN — LIDOCAINE 2 PATCH: 560 PATCH PERCUTANEOUS; TOPICAL; TRANSDERMAL at 11:09

## 2023-01-01 RX ADMIN — DICLOFENAC SODIUM 2 G: 10 GEL TOPICAL at 22:08

## 2023-01-01 RX ADMIN — ACETAMINOPHEN 1000 MG: 500 TABLET, FILM COATED ORAL at 07:56

## 2023-01-01 RX ADMIN — EMTRICITABINE AND TENOFOVIR DISOPROXIL FUMARATE 1 TABLET: 200; 300 TABLET, FILM COATED ORAL at 17:39

## 2023-01-01 RX ADMIN — TOPIRAMATE 100 MG: 100 TABLET, FILM COATED ORAL at 08:46

## 2023-01-01 RX ADMIN — LIDOCAINE 2 PATCH: 560 PATCH PERCUTANEOUS; TOPICAL; TRANSDERMAL at 08:08

## 2023-01-01 RX ADMIN — ACETAMINOPHEN 1000 MG: 500 TABLET, FILM COATED ORAL at 18:24

## 2023-01-01 RX ADMIN — DICLOFENAC SODIUM 2 G: 10 GEL TOPICAL at 16:22

## 2023-01-01 RX ADMIN — ACETAMINOPHEN 1000 MG: 500 TABLET, FILM COATED ORAL at 08:34

## 2023-01-01 RX ADMIN — ACETAMINOPHEN 1000 MG: 500 TABLET, FILM COATED ORAL at 17:09

## 2023-01-01 RX ADMIN — TOPIRAMATE 100 MG: 100 TABLET, FILM COATED ORAL at 22:07

## 2023-01-01 RX ADMIN — TOPIRAMATE 100 MG: 100 TABLET, FILM COATED ORAL at 08:52

## 2023-01-01 RX ADMIN — TOPIRAMATE 100 MG: 100 TABLET, FILM COATED ORAL at 09:30

## 2023-01-01 RX ADMIN — TOPIRAMATE 100 MG: 100 TABLET, FILM COATED ORAL at 21:32

## 2023-01-01 RX ADMIN — POTASSIUM & SODIUM PHOSPHATES POWDER PACK 280-160-250 MG 1 PACKET: 280-160-250 PACK at 17:09

## 2023-01-01 RX ADMIN — SODIUM PHOSPHATE, MONOBASIC, MONOHYDRATE AND SODIUM PHOSPHATE, DIBASIC, ANHYDROUS 9 MMOL: 142; 276 INJECTION, SOLUTION INTRAVENOUS at 16:39

## 2023-01-01 RX ADMIN — ACETAMINOPHEN 1000 MG: 500 TABLET, FILM COATED ORAL at 20:50

## 2023-01-01 RX ADMIN — TOPIRAMATE 100 MG: 100 TABLET, FILM COATED ORAL at 08:17

## 2023-01-01 RX ADMIN — EMTRICITABINE AND TENOFOVIR DISOPROXIL FUMARATE 1 TABLET: 200; 300 TABLET, FILM COATED ORAL at 17:09

## 2023-01-01 RX ADMIN — TOPIRAMATE 100 MG: 100 TABLET, FILM COATED ORAL at 10:27

## 2023-01-01 RX ADMIN — ONDANSETRON 4 MG: 4 TABLET, ORALLY DISINTEGRATING ORAL at 12:32

## 2023-01-01 RX ADMIN — POLYETHYLENE GLYCOL 3350 17 G: 17 POWDER, FOR SOLUTION ORAL at 08:42

## 2023-01-01 RX ADMIN — NALOXONE HYDROCHLORIDE 0.6 MG/HR: 1 INJECTION PARENTERAL at 03:37

## 2023-01-01 RX ADMIN — TOPIRAMATE 100 MG: 100 TABLET, FILM COATED ORAL at 08:07

## 2023-01-01 RX ADMIN — ACETAMINOPHEN 1000 MG: 500 TABLET, FILM COATED ORAL at 16:24

## 2023-01-01 RX ADMIN — Medication 30 ML: at 11:19

## 2023-01-01 RX ADMIN — POTASSIUM & SODIUM PHOSPHATES POWDER PACK 280-160-250 MG 1 PACKET: 280-160-250 PACK at 11:27

## 2023-01-01 RX ADMIN — IBUPROFEN 600 MG: 600 TABLET ORAL at 13:12

## 2023-01-01 RX ADMIN — TOPIRAMATE 100 MG: 100 TABLET, FILM COATED ORAL at 20:49

## 2023-01-01 RX ADMIN — ONDANSETRON 4 MG: 4 TABLET, ORALLY DISINTEGRATING ORAL at 18:30

## 2023-01-01 RX ADMIN — TOPIRAMATE 100 MG: 100 TABLET, FILM COATED ORAL at 21:18

## 2023-01-01 RX ADMIN — EMTRICITABINE AND TENOFOVIR DISOPROXIL FUMARATE 1 TABLET: 200; 300 TABLET, FILM COATED ORAL at 16:22

## 2023-01-01 RX ADMIN — ACETAMINOPHEN 1000 MG: 500 TABLET, FILM COATED ORAL at 21:05

## 2023-01-01 RX ADMIN — Medication: at 16:26

## 2023-01-01 RX ADMIN — ACETAMINOPHEN 1000 MG: 500 TABLET, FILM COATED ORAL at 07:46

## 2023-01-01 RX ADMIN — TOPIRAMATE 100 MG: 100 TABLET, FILM COATED ORAL at 08:47

## 2023-01-01 RX ADMIN — DICLOFENAC SODIUM 2 G: 10 GEL TOPICAL at 18:17

## 2023-01-01 RX ADMIN — TOPIRAMATE 100 MG: 100 TABLET, FILM COATED ORAL at 22:47

## 2023-01-01 RX ADMIN — KETOROLAC TROMETHAMINE 15 MG: 15 INJECTION, SOLUTION INTRAMUSCULAR; INTRAVENOUS at 19:28

## 2023-01-01 RX ADMIN — TOPIRAMATE 50 MG: 50 TABLET, FILM COATED ORAL at 20:13

## 2023-01-01 RX ADMIN — NALOXONE HYDROCHLORIDE 0.4 MG: 0.4 INJECTION, SOLUTION INTRAMUSCULAR; INTRAVENOUS; SUBCUTANEOUS at 01:26

## 2023-01-01 RX ADMIN — DICLOFENAC SODIUM 2 G: 10 GEL TOPICAL at 19:20

## 2023-01-01 RX ADMIN — SENNOSIDES AND DOCUSATE SODIUM 2 TABLET: 50; 8.6 TABLET ORAL at 08:42

## 2023-01-01 RX ADMIN — TOPIRAMATE 100 MG: 100 TABLET, FILM COATED ORAL at 07:56

## 2023-01-01 RX ADMIN — ACETAMINOPHEN 1000 MG: 500 TABLET, FILM COATED ORAL at 21:36

## 2023-01-01 RX ADMIN — ACETAMINOPHEN 1000 MG: 500 TABLET, FILM COATED ORAL at 08:52

## 2023-01-01 RX ADMIN — EMTRICITABINE AND TENOFOVIR DISOPROXIL FUMARATE 1 TABLET: 200; 300 TABLET, FILM COATED ORAL at 17:17

## 2023-01-01 RX ADMIN — IBUPROFEN 600 MG: 600 TABLET ORAL at 22:09

## 2023-01-01 RX ADMIN — POTASSIUM & SODIUM PHOSPHATES POWDER PACK 280-160-250 MG 2 PACKET: 280-160-250 PACK at 20:51

## 2023-01-01 RX ADMIN — Medication 1 MG: at 23:57

## 2023-01-01 RX ADMIN — ACETAMINOPHEN 1000 MG: 500 TABLET, FILM COATED ORAL at 09:18

## 2023-01-01 RX ADMIN — SODIUM CHLORIDE 1000 ML: 9 INJECTION, SOLUTION INTRAVENOUS at 19:28

## 2023-01-01 RX ADMIN — ACETAMINOPHEN 1000 MG: 500 TABLET, FILM COATED ORAL at 22:26

## 2023-01-01 RX ADMIN — POTASSIUM & SODIUM PHOSPHATES POWDER PACK 280-160-250 MG 1 PACKET: 280-160-250 PACK at 04:37

## 2023-01-01 RX ADMIN — ACETAMINOPHEN 1000 MG: 500 TABLET, FILM COATED ORAL at 09:19

## 2023-01-01 RX ADMIN — NALOXONE HYDROCHLORIDE 0.2 MG: 0.4 INJECTION, SOLUTION INTRAMUSCULAR; INTRAVENOUS; SUBCUTANEOUS at 02:07

## 2023-01-01 RX ADMIN — EMTRICITABINE AND TENOFOVIR DISOPROXIL FUMARATE 1 TABLET: 200; 300 TABLET, FILM COATED ORAL at 18:25

## 2023-01-01 RX ADMIN — TOPIRAMATE 100 MG: 100 TABLET, FILM COATED ORAL at 20:55

## 2023-01-01 RX ADMIN — POTASSIUM & SODIUM PHOSPHATES POWDER PACK 280-160-250 MG 1 PACKET: 280-160-250 PACK at 14:18

## 2023-01-01 RX ADMIN — DICLOFENAC SODIUM 2 G: 10 GEL TOPICAL at 13:33

## 2023-01-01 RX ADMIN — EMTRICITABINE AND TENOFOVIR DISOPROXIL FUMARATE 1 TABLET: 200; 300 TABLET, FILM COATED ORAL at 16:03

## 2023-01-01 RX ADMIN — EMTRICITABINE AND TENOFOVIR DISOPROXIL FUMARATE 1 TABLET: 200; 300 TABLET, FILM COATED ORAL at 16:29

## 2023-01-01 RX ADMIN — ACETAMINOPHEN 1000 MG: 500 TABLET, FILM COATED ORAL at 20:39

## 2023-01-01 RX ADMIN — TOPIRAMATE 100 MG: 100 TABLET, FILM COATED ORAL at 07:46

## 2023-01-01 RX ADMIN — LIDOCAINE 1 PATCH: 560 PATCH PERCUTANEOUS; TOPICAL; TRANSDERMAL at 09:16

## 2023-01-01 RX ADMIN — POTASSIUM & SODIUM PHOSPHATES POWDER PACK 280-160-250 MG 1 PACKET: 280-160-250 PACK at 15:48

## 2023-01-01 RX ADMIN — ACETAMINOPHEN 1000 MG: 500 TABLET, FILM COATED ORAL at 08:11

## 2023-01-01 RX ADMIN — ACETAMINOPHEN 1000 MG: 500 TABLET, FILM COATED ORAL at 16:29

## 2023-01-01 RX ADMIN — IBUPROFEN 600 MG: 600 TABLET ORAL at 15:02

## 2023-01-01 RX ADMIN — ACETAMINOPHEN 1000 MG: 500 TABLET, FILM COATED ORAL at 10:02

## 2023-01-01 RX ADMIN — ACETAMINOPHEN 1000 MG: 500 TABLET, FILM COATED ORAL at 16:03

## 2023-01-01 RX ADMIN — ONDANSETRON 4 MG: 4 TABLET, ORALLY DISINTEGRATING ORAL at 19:45

## 2023-01-01 RX ADMIN — ACETAMINOPHEN 1000 MG: 500 TABLET, FILM COATED ORAL at 21:18

## 2023-01-01 RX ADMIN — TOPIRAMATE 100 MG: 100 TABLET, FILM COATED ORAL at 09:19

## 2023-01-01 RX ADMIN — IBUPROFEN 600 MG: 600 TABLET ORAL at 16:22

## 2023-01-01 RX ADMIN — ACETAMINOPHEN 1000 MG: 500 TABLET, FILM COATED ORAL at 15:48

## 2023-01-01 ASSESSMENT — ACTIVITIES OF DAILY LIVING (ADL)
CONCENTRATING,_REMEMBERING_OR_MAKING_DECISIONS_DIFFICULTY: NO
ADLS_ACUITY_SCORE: 20
ADLS_ACUITY_SCORE: 18
ADLS_ACUITY_SCORE: 32
ADLS_ACUITY_SCORE: 20
ADLS_ACUITY_SCORE: 20
ADLS_ACUITY_SCORE: 32
DRESSING/BATHING_DIFFICULTY: NO
ADLS_ACUITY_SCORE: 20
ADLS_ACUITY_SCORE: 31
ADLS_ACUITY_SCORE: 20
ADLS_ACUITY_SCORE: 31
ADLS_ACUITY_SCORE: 20
ADLS_ACUITY_SCORE: 20
ADLS_ACUITY_SCORE: 35
ADLS_ACUITY_SCORE: 20
ADLS_ACUITY_SCORE: 31
ADLS_ACUITY_SCORE: 20
ADLS_ACUITY_SCORE: 32
ADLS_ACUITY_SCORE: 20
ADLS_ACUITY_SCORE: 32
ADLS_ACUITY_SCORE: 20
ADLS_ACUITY_SCORE: 31
ADLS_ACUITY_SCORE: 20
DIFFICULTY_EATING/SWALLOWING: NO
ADLS_ACUITY_SCORE: 35
ADLS_ACUITY_SCORE: 20
ADLS_ACUITY_SCORE: 32
ADLS_ACUITY_SCORE: 20
ADLS_ACUITY_SCORE: 18
ADLS_ACUITY_SCORE: 20
ADLS_ACUITY_SCORE: 20
ADLS_ACUITY_SCORE: 31
ADLS_ACUITY_SCORE: 32
ADLS_ACUITY_SCORE: 20
ADLS_ACUITY_SCORE: 31
ADLS_ACUITY_SCORE: 20
ADLS_ACUITY_SCORE: 31
ADLS_ACUITY_SCORE: 20
ADLS_ACUITY_SCORE: 31
ADLS_ACUITY_SCORE: 20
ADLS_ACUITY_SCORE: 20
ADLS_ACUITY_SCORE: 35
ADLS_ACUITY_SCORE: 20
ADLS_ACUITY_SCORE: 35
ADLS_ACUITY_SCORE: 20
ADLS_ACUITY_SCORE: 35
ADLS_ACUITY_SCORE: 32
ADLS_ACUITY_SCORE: 20
WALKING_OR_CLIMBING_STAIRS_DIFFICULTY: NO
ADLS_ACUITY_SCORE: 20
ADLS_ACUITY_SCORE: 18
ADLS_ACUITY_SCORE: 20
WEAR_GLASSES_OR_BLIND: NO
ADLS_ACUITY_SCORE: 20
ADLS_ACUITY_SCORE: 32
ADLS_ACUITY_SCORE: 20
ADLS_ACUITY_SCORE: 32
ADLS_ACUITY_SCORE: 20
ADLS_ACUITY_SCORE: 31
ADLS_ACUITY_SCORE: 31
ADLS_ACUITY_SCORE: 20
ADLS_ACUITY_SCORE: 32
ADLS_ACUITY_SCORE: 20
ADLS_ACUITY_SCORE: 32
ADLS_ACUITY_SCORE: 20
ADLS_ACUITY_SCORE: 32
ADLS_ACUITY_SCORE: 20
ADLS_ACUITY_SCORE: 32
ADLS_ACUITY_SCORE: 38
ADLS_ACUITY_SCORE: 20
ADLS_ACUITY_SCORE: 31
ADLS_ACUITY_SCORE: 20
ADLS_ACUITY_SCORE: 32
ADLS_ACUITY_SCORE: 20
DOING_ERRANDS_INDEPENDENTLY_DIFFICULTY: NO
ADLS_ACUITY_SCORE: 32
FALL_HISTORY_WITHIN_LAST_SIX_MONTHS: NO
ADLS_ACUITY_SCORE: 20
ADLS_ACUITY_SCORE: 31
ADLS_ACUITY_SCORE: 20
ADLS_ACUITY_SCORE: 20
ADLS_ACUITY_SCORE: 31
ADLS_ACUITY_SCORE: 20
ADLS_ACUITY_SCORE: 31
ADLS_ACUITY_SCORE: 20
ADLS_ACUITY_SCORE: 35
ADLS_ACUITY_SCORE: 20
ADLS_ACUITY_SCORE: 20
ADLS_ACUITY_SCORE: 18
ADLS_ACUITY_SCORE: 20
ADLS_ACUITY_SCORE: 36
CHANGE_IN_FUNCTIONAL_STATUS_SINCE_ONSET_OF_CURRENT_ILLNESS/INJURY: NO
ADLS_ACUITY_SCORE: 20
ADLS_ACUITY_SCORE: 36
ADLS_ACUITY_SCORE: 32
ADLS_ACUITY_SCORE: 32
TOILETING_ISSUES: NO
ADLS_ACUITY_SCORE: 20
ADLS_ACUITY_SCORE: 31
ADLS_ACUITY_SCORE: 31
ADLS_ACUITY_SCORE: 20
ADLS_ACUITY_SCORE: 20

## 2023-01-01 ASSESSMENT — COLUMBIA-SUICIDE SEVERITY RATING SCALE - C-SSRS
TOTAL  NUMBER OF PREPARATORY ACTS LIFETIME: 14
1. HAVE YOU WISHED YOU WERE DEAD OR WISHED YOU COULD GO TO SLEEP AND NOT WAKE UP?: YES
TOTAL  NUMBER OF INTERRUPTED ATTEMPTS LIFETIME: 14
LETHALITY/MEDICAL DAMAGE CODE FIRST POTENTIAL ATTEMPT: BEHAVIOR LIKELY TO RESULT IN INJURY BUT NOT LIKELY TO CAUSE DEATH
MOST LETHAL DATE: 66595
2. HAVE YOU ACTUALLY HAD ANY THOUGHTS OF KILLING YOURSELF?: YES
TOTAL  NUMBER OF ACTUAL ATTEMPTS PAST 3 MONTHS: 1
TOTAL  NUMBER OF ACTUAL ATTEMPTS LIFETIME: 14
5. HAVE YOU STARTED TO WORK OUT OR WORKED OUT THE DETAILS OF HOW TO KILL YOURSELF? DO YOU INTEND TO CARRY OUT THIS PLAN?: YES
6. HAVE YOU EVER DONE ANYTHING, STARTED TO DO ANYTHING, OR PREPARED TO DO ANYTHING TO END YOUR LIFE?: YES
TOTAL  NUMBER OF ABORTED OR SELF INTERRUPTED ATTEMPTS SINCE LAST CONTACT: 1
5. HAVE YOU STARTED TO WORK OUT OR WORKED OUT THE DETAILS OF HOW TO KILL YOURSELF? DO YOU INTEND TO CARRY OUT THIS PLAN?: YES
ATTEMPT PAST THREE MONTHS: YES
4. HAVE YOU HAD THESE THOUGHTS AND HAD SOME INTENTION OF ACTING ON THEM?: YES
TOTAL  NUMBER OF INTERRUPTED ATTEMPTS LIFETIME: YES
REASONS FOR IDEATION LIFETIME: COMPLETELY TO END OR STOP THE PAIN (YOU COULDN'T GO ON LIVING WITH THE PAIN OR HOW YOU WERE FEELING)
TOTAL  NUMBER OF ABORTED OR SELF INTERRUPTED ATTEMPTS LIFETIME: YES
LETHALITY/MEDICAL DAMAGE CODE MOST RECENT ACTUAL ATTEMPT: MINOR PHYSICAL DAMAGE
TOTAL  NUMBER OF ABORTED OR SELF INTERRUPTED ATTEMPTS LIFETIME: 14
6. HAVE YOU EVER DONE ANYTHING, STARTED TO DO ANYTHING, OR PREPARED TO DO ANYTHING TO END YOUR LIFE?: YES
LETHALITY/MEDICAL DAMAGE CODE MOST RECENT POTENTIAL ATTEMPT: BEHAVIOR LIKELY TO RESULT IN DEATH DESPITE AVAILABLE MEDICAL CARE
ATTEMPT LIFETIME: YES
TOTAL  NUMBER OF INTERRUPTED ATTEMPTS PAST 3 MONTHS: NO
2. HAVE YOU ACTUALLY HAD ANY THOUGHTS OF KILLING YOURSELF?: YES
1. IN THE PAST MONTH, HAVE YOU WISHED YOU WERE DEAD OR WISHED YOU COULD GO TO SLEEP AND NOT WAKE UP?: YES
FIRST ATTEMPT DATE: 62823
LETHALITY/MEDICAL DAMAGE CODE MOST LETHAL POTENTIAL ATTEMPT: BEHAVIOR LIKELY TO RESULT IN DEATH DESPITE AVAILABLE MEDICAL CARE
TOTAL  NUMBER OF PREPARATORY ACTS PAST 3 MONTHS: 1
LETHALITY/MEDICAL DAMAGE CODE FIRST ACTUAL ATTEMPT: MINOR PHYSICAL DAMAGE
REASONS FOR IDEATION PAST MONTH: EQUALLY TO GET ATTENTION, REVENGE, OR A REACTION FROM OTHERS AND TO END/STOP THE PAIN
3. HAVE YOU BEEN THINKING ABOUT HOW YOU MIGHT KILL YOURSELF?: YES
4. HAVE YOU HAD THESE THOUGHTS AND HAD SOME INTENTION OF ACTING ON THEM?: YES
MOST RECENT DATE: 66595
TOTAL  NUMBER OF ABORTED OR SELF INTERRUPTED ATTEMPTS PAST 3 MONTHS: YES
LETHALITY/MEDICAL DAMAGE CODE MOST LETHAL ACTUAL ATTEMPT: MINOR PHYSICAL DAMAGE

## 2023-01-01 ASSESSMENT — PATIENT HEALTH QUESTIONNAIRE - PHQ9: SUM OF ALL RESPONSES TO PHQ QUESTIONS 1-9: 17
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
## 2023-06-10 NOTE — ED TRIAGE NOTES
Pt was in drug rehab facility for the last two weeks. Pt states he has been feeling suicidal and the facility doesn't have a mental health so they called the police to bring him to ED for psych evaluation. Pt is here voluntary to get help. Pt states that he can go back to his facility at the end of day.

## 2023-06-10 NOTE — DISCHARGE INSTRUCTIONS
Aftercare Plan  If I am feeling unsafe or I am in a crisis, I will:   Contact my established care providers   Call the National Suicide Prevention Lifeline: 988  Go to the nearest emergency room   Call 911     Warning signs that I or other people might notice when a crisis is developing for me:   Feeling bullied  Feeling alone  Being triggered in trauma    Things I am able to do on my own to cope or help me feel better:   Connect with nature  Connect with god  Distract yourself  Remind yourself that this is a trauma trigger and you are safe and supported in this moment  Remember motivations to live: 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; creating your own family; honoring your dad    Things that I am able to do with others to cope or help me better:   Talk with Brittany  Talk with Domenico (therapist)  Tell on yourself - don't let the emotions live in silence - take their power away    Things I can use or do for distraction:   Deep breathing  Take a walk    Changes I can make to support my mental health and wellness:   Stay sober  Connect with others  Work on trauma with Part[place]    People in my life that I can ask for help:   Staff at [place]  Mom and dad  Trusted friends    [facility] has a mental health crisis team you can call 24/7: [facility]  [phone]       Additional resources and information: Substance Use Disorder Resources    It is recommended that you abstain from all mood altering chemicals.     Free Resources:    [place] [facility] ([facility])  [facility] connects people seeking recovery to resources that help foster and sustain long-term recovery. Whether you are seeking resources for treatment, transportation, housing, job training, education, health care or other pathways to recovery, [facility] is a great place to start.  Phone: 875.674.4256. www.[place]Horrance.Ghostruck (Great listing of all types of recovery and non-recovery related resources)    Alcoholics Anonymous  Phone: 5-509-ALCOHOL  Website:  "HTTP://WWW.AA.ORG/  AA [place] (831-660-5987 or http://aamin[place].org)  AA [place] (511-934-2215 or www.aastpaul.org)     Narcotics Anonymous  Phone: 974.869.6188  Website: www.Diplopia.Hlidacky.cz.              Crisis Lines  Crisis Text Line  Text 334775  You will be connected with a trained live crisis counselor to provide support.    Por espanol, texto  AARON a 651205 o texto a 442-AYUDAME en WhatsApp    The Sai Project (LGBTQ Youth Crisis Line)  9.476.438.7221  text START to 697-430      Community Resources  Fast Tracker  Linking people to mental health and substance use disorder resources  [employer].org     [place] Mental Health Warm Line  Peer to peer support  Monday thru Saturday, 12 pm to 10 pm  344.123.1111 or 0.228.838.5570  Text \"Support\" to 27842    National [place] on Mental Illness (WENDY)  271.002.6134 or 1.888.WENDY.HELPS      Mental Health Apps  My3  https://Contour Innovationspp.org/    VirtualHopeBox  https://Venture Incite.org/apps/virtual-hope-box/      Additional Information  Today you were seen by a licensed mental health professional through Triage and Transition services, Behavioral Healthcare Providers ([facility])  for a crisis assessment in the Emergency Department at [facility].  It is recommended that you follow up with your established providers (psychiatrist, mental health therapist, and/or primary care doctor - as relevant) as soon as possible. Coordinators from [facility] will be calling you in the next 24-48 hours to ensure that you have the resources you need.  You can also contact [facility] coordinators directly at [phone]. You may have been scheduled for or offered an appointment with a mental health provider. [facility] maintains an extensive network of licensed behavioral health providers to connect patients with the services they need.  We do not charge providers a fee to participate in our referral network.  We match patients with providers based on a patient's specific needs, " insurance coverage, and location.  Our first effort will be to refer you to a provider within your care system, and will utilize providers outside your care system as needed.

## 2023-07-26 PROBLEM — F90.9 ADHD (ATTENTION DEFICIT HYPERACTIVITY DISORDER): Status: ACTIVE | Noted: 2023-01-01

## 2023-07-26 PROBLEM — F43.10 PTSD (POST-TRAUMATIC STRESS DISORDER): Status: ACTIVE | Noted: 2023-01-01

## 2023-07-26 PROBLEM — R56.9 SEIZURE (H): Status: ACTIVE | Noted: 2023-01-01

## 2023-07-26 PROBLEM — F22: Status: ACTIVE | Noted: 2023-01-01

## 2023-07-26 PROBLEM — F31.81 BIPOLAR 2 DISORDER (H): Status: ACTIVE | Noted: 2023-01-01

## 2023-07-26 PROBLEM — G47.00 INSOMNIA: Status: ACTIVE | Noted: 2023-01-01

## 2023-07-26 PROBLEM — F19.20 CHEMICAL DEPENDENCY (H): Status: ACTIVE | Noted: 2023-01-01

## 2023-07-26 PROBLEM — F32.A DEPRESSIVE DISORDER: Status: ACTIVE | Noted: 2023-01-01

## 2023-07-26 NOTE — PROGRESS NOTES
Preceptor Attestation:    I discussed the patient with the resident and evaluated the patient in person. I have verified the content of the note, which accurately reflects my assessment of the patient and the plan of care.   Supervising Physician:  Whit Collier MD.

## 2023-07-26 NOTE — PATIENT INSTRUCTIONS
Therapists in [place] and Surrounding Areas    [facility] for kids and teens  [place] ADDRESS  [address]  [place] [address]  [place] ADDRESS  [address]  [address]  [place] [address]  TELEPHONE  [phone]  [email or web address]     ADULTS, FAMILY, maybe TEENS    [email or web address]  [facility]     [facility] (Multiple Locations)   [phone]    HEALTH PSYCHOLOGY @[facility]  - Antwon Russo   - Judy Gimenez  -Sandee Prescott  - Eva Hdz  - Fabi Reyes  - Susie Lyons  - Koby Moreno  - Viet Whiting  - Cancer support group with Dr Sergo Rosales, PhD, LP                                                   *Adolescents, Adults, Couples & Family  Specializes in PTSD  [place] [address]  [phone]    Khadijah Sheldon, PhD,  LP                                          *Individual, 18+ only  Specializes with comorbid medical conditions  [address]  [facility] [address]  [phone]    Messi Dunn, PhD                                                   *Individual, 18+ only  [address]  [place], [place] [address]  [phone]    DEENA DayN, MA, LP  [address]  [place], [place] [address] [phone]    J Luis Marin, PhD  [address]  [place] [address]  [phone]    Calos Avila, PhD, LMFT  Individual, Couples & Family  [address] [place] [address]  [phone]    JOSEE Mcdonald, LICSW  Specializes in trauma therapy  [address]  [place], [place] [address] [phone]    JOSE M Reilly  Specializes in EMDR and trauma focused CBT  [facility]  [address]  [place] [address]  [phone]    Maryanne Mtz MA, LMFT  Specializes in PTSD  [address]  [place] [address]  [phone]    Anamaria Novoa MA, LP    Adolescents, Adults, Couples & Family      OR     Private Practice  [facility]                                   [address]                                          [place] [address]  [address]                                                 [phone]                              [place] [address] [phone]    Eusebia Larios, LMFT, LICSW                             Individual, Couples, & Family                     [facility]   [address])   [place] [address]  [phone]    Doreen Goldberg MS, LMFT  Individuals, Couples, & Family  [address]  [place], [place] [id number] [phone]  [email or web address]    Therapy Clinics in/near [facility] ([facility])   [phone]    [facility]xiety Treatment Resources ([place])   [usually they are full and closed to new pts]  [phone]    [facility] ([facility])   [phone]    [facility]  [phone]     [employer] [facility])    [phone]     [facility] (women only) ([facility])   [phone]    [facility] (Multiple Locations)   [phone]    [facility]*  ([place])   [phone]    [facility] ([place])   [phone]    [facility] ([facility])   [phone]    [place] [facility] ([facility]): [phone]     [Rhianna Cooney, [facility], [place], [place], [place], ]    [facility] in [facility] [phone]    [facility]*  ([facility])   [phone]     [facility]                                                                                                       [facility] ([facility])   [phone]    Kei (Rhianna)    [phone]    [facility]  [phone]    [facility] [place], [place] [phone]    [facility] ([facility])   [phone]    [facility])* ([facility])   [phone]    [facility] in [place]    *Offers sliding fee schedule  ~Accepts Medicare as a sole payer source    Autism eval   Autism Society website. One place that offers them is at [facility], and their information is [address], [place] [address]     [phone]        Therapists in [place] and Surrounding Areas    [facility]  Ph: [phone]  Fx: [phone]      Caroline Somers, PhD, ABPP, LP  Individual & Couples  [facility]  [address] [place] [address] [phone]  [email or web address]    Daisy Christy MA, COLLETTE  [address]  [place] [address]  [phone]    Naheed Suero PsyD, LP  Sally Chirinos PsyD, COLLETTE Lindsay MA, LP   Specialize in Prolonged Exposure  [facility]   [address]  [place] [address]  [phone]    [facility]  3 locations:  [facility] and     Sada Guillaume PsyD, COLLETTE   MedNews   Address:  [address] [place] [address]   Phone:  [phone]   Website: http://www.Pure Energy [email or web address]     [facility]   Address: [address], [place] [address]   Phone: [phone]   Website: [email or web address]

## 2023-07-26 NOTE — PROGRESS NOTES
Assessment & Plan     Gender dysphoria  Started HRT April 12, 2018 - injectable testosterone once weekly. After turning 19, because of insurance issues, was using beyond expiration and getting it online. Currently using testosterone cypionate at 250mg/ml concentration, injecting 0.5ml weekly (125mg weekly).   - Testosterone total; Future  - Hepatic Panel; Future  - CBC with Diff Plt; Future  - Lipid Cascade; Future  - Glucose; Future  - Gender Support Clinic Referral -  [facility]; Future  - Testosterone total  - Hepatic Panel  - CBC with Diff Plt  - Lipid Cascade  - Glucose    Bipolar 2 disorder (H)  Diagnosed with bipolar 2 disorder several years ago, does not recall what medications he was on back in [place]. Recently saw [facility]rology through [facility] and was started on Lamictal and topomax for hx of seizure disorder. While lamictal may help with bipolar disorder, in the context of 2 recent suicide attempts via strangulation ( 6/10 and 7/6), will place urgent referral to psychiatry to optimize medications. Denies active suicidal intent or plan at this time. Living in a group home environment that is supportive. Is able to call mom and aunt when having suicidal thoughts, both are supportive and live in [place].   - Adult Mental Health  Referral; Future    Screening for HIV (human immunodeficiency virus)  - HIV Screening; Future  - HIV Screening    Need for hepatitis C screening test  - Hepatitis C Screen Reflex to HCV RNA Quant and Genotype; Future  - Hepatitis C Screen Reflex to HCV RNA Quant and Genotype    Return in about 4 weeks (around 8/23/2023).    Jazmine Rowe MD  [facility] GRAY Jenkins is a 23 year old, presenting for the following health issues:  Consult (Continuing HRT. Patient is from [place]. Was buying HRT medication online )      7/26/2023     2:22 PM   Additional Questions   Roomed by tamia   Accompanied by self       HPI     Was living in [place], moved  "out here last year was trying to get clean on his own and relapsed. Has been rehab since May - [facility] - cocaine and meth use. Got out of rehab on July 7th. Is currently living at a sober house - feels like this is going well, good support system. Mother lives in [place], [place], brother in [place]. Mother is very supportive, talks to her all the time. Aunt in [place].     Has not been to a doctor's office since he was 19.     Was at [facility], which was a rough place, during which time he had a suicide attempt on 6/10 - tried to strangle self, again on 7/6. Goes to the beach, into scuba diving, reaches out to people. Does not have a therapist; feels like he could teach therapy at this point.     Started HRT April 12, 2018 - injectable testosterone once weekly. After turning 19, because of insurance issues, was using beyond expiration and getting it online. Has been using testosterone from online       [facility] prescribing PREP.    Review of Systems    ROS: 10 point ROS neg other than the symptoms noted above in the HPI.        Objective    /78   Pulse 63   Temp 98.2  F (36.8  C) (Oral)   Resp 16   Ht 1.575 m (5' 2\")   Wt 64.4 kg (142 lb)   SpO2 97%   BMI 25.97 kg/m    Body mass index is 25.97 kg/m .  Physical Exam  Vitals reviewed.   Constitutional:       Appearance: Normal appearance.   Eyes:      Conjunctiva/sclera: Conjunctivae normal.   Cardiovascular:      Rate and Rhythm: Normal rate and regular rhythm.      Heart sounds: Normal heart sounds.   Pulmonary:      Effort: Pulmonary effort is normal.      Breath sounds: Normal breath sounds.   Musculoskeletal:         General: No swelling.   Skin:     General: Skin is warm and dry.   Neurological:      Mental Status: He is alert.   Psychiatric:         Mood and Affect: Mood normal. Affect is blunt.         Behavior: Behavior normal.         Thought Content: Thought content normal.         Judgment: Judgment normal.                      "

## 2023-08-10 PROBLEM — S26.91XA CONTUSION OF HEART, INITIAL ENCOUNTER: Status: ACTIVE | Noted: 2023-01-01

## 2023-08-10 PROBLEM — T40.604A OPIATE OVERDOSE, UNDETERMINED INTENT, INITIAL ENCOUNTER (H): Status: ACTIVE | Noted: 2023-01-01

## 2023-08-10 PROBLEM — F43.10 PTSD (POST-TRAUMATIC STRESS DISORDER): Chronic | Status: ACTIVE | Noted: 2023-01-01

## 2023-08-10 PROBLEM — R45.851 SUICIDAL IDEATION: Status: ACTIVE | Noted: 2023-01-01

## 2023-08-10 PROBLEM — R79.89 ELEVATED TROPONIN: Status: ACTIVE | Noted: 2023-01-01

## 2023-08-10 NOTE — ED NOTES
Bed: ED15  Expected date:   Expected time:   Means of arrival:   Comments:  541  23  M overdose on fentanyl/3 narcans given/cpr for brief time  5748

## 2023-08-10 NOTE — ED PROVIDER NOTES
History     Chief Complaint:  Drug Overdose and Suicidal       The history is provided by the patient.      Magy Garrett is a 23 year old adult who presents via EMS with drug overdose and suicidal ideation. Patient reports he was smoking fentanyl today and overdosed. He reports [place] train and paramedics performed CPR. He reports the bystanders gave him narcan as well. Currently, he reports chest pain, mild shortness of breath, nausea and headache. He denies fever and diarrhea. Patient reports some suicidal ideation and says he wants to die and wishes he had . Patient reports his first time using fentanyl was a few days ago. Patient reports his choice of drugs are cocaine and meth which he denies using today. He reports he has overdosed on cocaine before. Patient notes he lives in a sober house management and was hoping he did not have to go back.     Independent Historian:   None - Patient Only    Review of External Notes:   Reviewed recent ER visits to [facility] in July and on 2023 when he visited for seizures.  He was given Keppra and is on Lamictal.      Medications:    Neurontin  Atarax  Lamictal   Topamax    Past Medical History:    Seizures  Bipolar 1  Depression  Paranoia  PTSD  ADHD    Past Surgical History:    Hernia repair  Septoplasty     Physical Exam   Patient Vitals for the past 24 hrs:   BP Temp Temp src Pulse Resp SpO2 Height Weight   23 1100 113/78 -- -- 55 11 100 % -- --   23 1002 110/65 98.2  F (36.8  C) -- (!) 49 11 100 % -- --   23 1000 110/65 -- -- (!) 42 10 97 % -- --   23 0900 124/74 -- -- 67 20 99 % -- --   23 0800 109/62 98.2  F (36.8  C) Temporal (!) 44 12 98 % -- --   23 0700 111/66 -- -- (!) 48 13 98 % -- --   23 0600 113/70 -- -- 75 13 99 % -- --   23 0500 98/62 -- -- 65 18 99 % -- --   23 0430 -- 97.9  F (36.6  C) -- -- -- -- -- 64.1 kg (141 lb 5 oz)   23 0400 -- -- -- 56 19 98 % -- --  "  08/11/23 0330 108/67 -- -- (!) 45 12 97 % -- --   08/11/23 0300 105/67 -- -- (!) 48 17 98 % -- --   08/11/23 0245 -- -- -- (!) 45 11 96 % -- --   08/11/23 0230 102/55 -- -- (!) 45 10 97 % -- --   08/11/23 0215 -- -- -- (!) 43 10 98 % -- --   08/11/23 0200 110/73 -- -- 51 12 97 % -- --   08/11/23 0145 -- -- -- 61 19 99 % -- --   08/11/23 0130 122/70 -- -- (!) 43 24 98 % -- --   08/11/23 0115 -- -- -- (!) 48 10 97 % -- --   08/11/23 0100 117/68 -- -- (!) 40 10 98 % -- --   08/11/23 0045 -- -- -- 54 (!) 7 97 % -- --   08/11/23 0030 114/59 -- -- (!) 44 19 97 % -- --   08/11/23 0015 -- -- -- (!) 49 11 97 % -- --   08/11/23 0000 111/58 -- -- (!) 43 (!) 9 97 % -- --   08/10/23 2335 121/65 -- -- (!) 49 (!) 9 97 % -- --   08/10/23 2314 100/66 -- -- 50 (!) 9 97 % -- --   08/10/23 1954 -- -- -- 73 17 97 % -- --   08/10/23 1810 -- -- -- -- -- -- 1.6 m (5' 3\") 65.8 kg (145 lb)   08/10/23 1808 -- 98.3  F (36.8  C) Temporal 86 -- 98 % -- --   08/10/23 1806 123/62 98  F (36.7  C) Temporal -- 18 -- -- --        Physical Exam  Vitals and nursing note reviewed.   Constitutional:       General: He is not in acute distress.     Appearance: He is not ill-appearing.   HENT:      Head: Normocephalic and atraumatic.      Right Ear: External ear normal.      Left Ear: External ear normal.      Nose: Nose normal.   Eyes:      Extraocular Movements: Extraocular movements intact.      Conjunctiva/sclera: Conjunctivae normal.      Pupils: Pupils are equal, round, and reactive to light.   Cardiovascular:      Rate and Rhythm: Normal rate and regular rhythm.      Heart sounds: No murmur heard.  Pulmonary:      Effort: Pulmonary effort is normal. No respiratory distress.      Breath sounds: No wheezing, rhonchi or rales.   Chest:      Chest wall: Tenderness present.   Abdominal:      General: Abdomen is flat. There is no distension.      Palpations: Abdomen is soft.      Tenderness: There is no abdominal tenderness. There is no guarding or " rebound.   Musculoskeletal:         General: No swelling or deformity.      Cervical back: Normal range of motion and neck supple.   Skin:     General: Skin is warm and dry.      Findings: No rash.   Neurological:      Mental Status: He is alert and oriented to person, place, and time.   Psychiatric:         Mood and Affect: Mood is depressed.         Behavior: Behavior normal.         Thought Content: Thought content includes suicidal ideation. Thought content does not include suicidal plan.           Emergency Department Course     ECG results from 08/10/23   EKG 12-lead, tracing only     Value    Systolic Blood Pressure     Diastolic Blood Pressure     Ventricular Rate 59    Atrial Rate 59    WY Interval 166    QRS Duration 96        QTc 368    P Axis 65    R AXIS 73    T Axis 60    Interpretation ECG      Sinus bradycardia with sinus arrhythmia  Cannot rule out Anterior infarct , age undetermined  Abnormal ECG  No previous ECGs available  Confirmed by GENERATED REPORT, COMPUTER ([id number]),  Ivis Nicholson ([id number]) on 8/10/2023 8:38:13 PM         Imaging:  Echocardiogram Complete   Final Result      XR Chest 2 Views   Final Result   IMPRESSION: Heart is normal in size. Lungs are clear.         Report per radiology    Laboratory:  Labs Ordered and Resulted from Time of ED Arrival to Time of ED Departure   COMPREHENSIVE METABOLIC PANEL - Abnormal       Result Value    Sodium 141      Potassium 3.5      Chloride 106      Carbon Dioxide (CO2) 20 (*)     Anion Gap 15      Urea Nitrogen 12.4      Creatinine 1.24 (*)     Calcium 9.4      Glucose 148 (*)     Alkaline Phosphatase 125      AST 25      ALT 26      Protein Total 7.5      Albumin 4.7      Bilirubin Total 0.6      GFR Estimate 62     ACETAMINOPHEN LEVEL - Abnormal    Acetaminophen <5.0 (*)    CBC WITH PLATELETS AND DIFFERENTIAL - Abnormal    WBC Count 10.5      RBC Count 5.30      Hemoglobin 16.3      Hematocrit 48.5      MCV 92      MCH 30.8       MCHC 33.6      RDW 15.2 (*)     Platelet Count 333      % Neutrophils 82      % Lymphocytes 11      % Monocytes 5      % Eosinophils 1      % Basophils 1      % Immature Granulocytes 0      NRBCs per 100 WBC 0      Absolute Neutrophils 8.7 (*)     Absolute Lymphocytes 1.1      Absolute Monocytes 0.6      Absolute Eosinophils 0.1      Absolute Basophils 0.1      Absolute Immature Granulocytes 0.0      Absolute NRBCs 0.0     TROPONIN T, HIGH SENSITIVITY - Abnormal    Troponin T, High Sensitivity 68 (*)    TROPONIN T, HIGH SENSITIVITY - Abnormal    Troponin T, High Sensitivity 46 (*)    TROPONIN T, HIGH SENSITIVITY - Normal    Troponin T, High Sensitivity 19     ETHYL ALCOHOL LEVEL - Normal    Alcohol ethyl <0.01     SALICYLATE LEVEL - Normal    Salicylate <0.3     LAMOTRIGINE LEVEL   GLUCOSE MONITOR NURSING POCT          Emergency Department Course & Assessments:    PSS-3      Date and Time Over the past 2 weeks have you felt down, depressed, or hopeless? Over the past 2 weeks have you had thoughts of killing yourself? Have you ever attempted to kill yourself? When did this last happen? User   08/10/23 1810 yes yes yes within the last month (but not today) [facility]          C-SSRS ([place])      Date and Time Q1 Wished to be Dead (Past Month) Q2 Suicidal Thoughts (Past Month) Q3 Suicidal Thought Method Q4 Suicidal Intent without Specific Plan Q5 Suicide Intent with Specific Plan Q6 Suicide Behavior (Lifetime) Within the Past 3 Months? RETIRED: Level of Risk per Screen Screening Not Complete User   08/10/23 2316 yes no no no no -- -- -- -- [facility]   08/10/23 1810 yes yes no no yes yes -- -- -- [facility]              Suicide assessment completed by mental health (D.E.C., LCSW, etc.)    Interventions:  Medications   emtricitabine-tenofovir (TRUVADA) 200-300 MG per tablet 1 tablet (has no administration in time range)   melatonin tablet 1 mg (has no administration in time range)   ondansetron (ZOFRAN ODT) ODT tab 4 mg (has no  administration in time range)     Or   ondansetron (ZOFRAN) injection 4 mg (has no administration in time range)   lidocaine 1 % 0.1-1 mL (has no administration in time range)   lidocaine (LMX4) cream (has no administration in time range)   sodium chloride (PF) 0.9% PF flush 3 mL (has no administration in time range)   sodium chloride (PF) 0.9% PF flush 3 mL (3 mLs Intracatheter Not Given 8/11/23 0922)   Lidocaine (LIDOCARE) 4 % Patch 2 patch (2 patches Transdermal $Patch/Med Applied 8/11/23 0922)   acetaminophen (TYLENOL) tablet 650 mg (has no administration in time range)     Or   acetaminophen (TYLENOL) Suppository 650 mg (has no administration in time range)   ibuprofen (ADVIL/MOTRIN) tablet 600 mg (has no administration in time range)   bisacodyl (DULCOLAX) suppository 10 mg (has no administration in time range)   polyethylene glycol (MIRALAX) Packet 17 g (has no administration in time range)   senna-docusate (SENOKOT-S/PERICOLACE) 8.6-50 MG per tablet 1 tablet (has no administration in time range)     Or   senna-docusate (SENOKOT-S/PERICOLACE) 8.6-50 MG per tablet 2 tablet (has no administration in time range)   prochlorperazine (COMPAZINE) injection 10 mg (has no administration in time range)     Or   prochlorperazine (COMPAZINE) tablet 10 mg (has no administration in time range)     Or   prochlorperazine (COMPAZINE) suppository 25 mg (has no administration in time range)   lidocaine 1 % 0.1-1 mL (has no administration in time range)   lidocaine (LMX4) cream (has no administration in time range)   sodium chloride (PF) 0.9% PF flush 3 mL (3 mLs Intracatheter Not Given 8/11/23 0922)   sodium chloride (PF) 0.9% PF flush 3 mL (has no administration in time range)   naloxone (NARCAN) injection 0.2 mg ( Intravenous See Alternative 8/11/23 0246)     Or   naloxone (NARCAN) injection 0.4 mg (0.4 mg Intravenous $Given 8/11/23 0246)     Or   naloxone (NARCAN) injection 0.2 mg ( Intramuscular See Alternative 8/11/23  0246)     Or   naloxone (NARCAN) injection 0.4 mg ( Intramuscular See Alternative 23 0246)   naloxone (NARCAN) 2.5 mg in sodium chloride 0.9 % 250 mL infusion ( Intravenous Held by provider 23 0950)   topiramate (TOPAMAX) tablet 100 mg (100 mg Oral $Given 23 0948)   topiramate (TOPAMAX) tablet 50 mg (has no administration in time range)   topiramate (TOPAMAX) tablet 100 mg (has no administration in time range)   acetaminophen (TYLENOL) tablet 1,000 mg (1,000 mg Oral $Given 23 1002)   0.9% sodium chloride BOLUS (0 mLs Intravenous Stopped 8/10/23 2314)   ketorolac (TORADOL) injection 15 mg (15 mg Intravenous $Given 8/10/23 1928)        Assessments:   I obtained history and examined the patient as noted above.     Independent Interpretation (X-rays, CTs, rhythm strip):  Chest x-ray reviewed there is no obvious infiltrate or pneumothorax or fracture per my read    Consultations/Discussion of Management or Tests:   I spoke with Dr. Sen of the hospitalist team regarding the patient, who accepted the patient for admission.          Social Determinants of Health affecting care:   None    Disposition:  The patient was admitted to the hospital under the care of Dr. Sen.     Impression & Plan    CMS Diagnoses: None      Medical Decision Makin-year-old male presenting with an apparent opiate overdose.  He did undergo chest compressions and received multiple doses of Narcan.  Is unclear whether he actually had a cardiac arrest or was just in respiratory arrest.  He now complains of chest pain.  He is awake and alert and vitally stable otherwise.  Chest x-ray does not show any signs of pneumothorax or rib fracture.  His lab work is significant for a elevated troponin that is trending upward.  I suspect this is likely due to either his respiratory arrest or cardiac contusion from CPR.  He also notes that he is feeling somewhat suicidal without a plan.  He does wish that he had  from the  overdose.  Initially we thought that he would be medically cleared so he was evaluated by DEC in the ER and placed on an JOHN.  Given his rising troponin, we will plan to admit for observation.  I discussed with Dr. Sen who will admit the patient.    Critical Care time:  was 0 minutes for this patient excluding procedures.    Diagnosis:    ICD-10-CM    1. Opiate overdose, undetermined intent, initial encounter (H)  T40.604A       2. Contusion of heart, initial encounter  S26.91XA       3. Elevated troponin  R77.8       4. Suicidal ideation  R45.851                Scribe Disclosure:  ALBARO CASH PRINCESS, am serving as a scribe at 6:38 PM on 8/10/2023 to document services personally performed by Billy Herman MD based on my observations and the provider's statements to me.     8/10/2023   Billy Herman MD Goodwin, Shaun M, MD  08/11/23 [id number]

## 2023-08-10 NOTE — ED TRIAGE NOTES
Pt presents via EMS for drug overdose. Pt has been riding bus and started to use fentanyl due to this. Pt reports he took a larger than normal dose, found down by bystanders. Pt given total of 12 mg narcan. Pt also had several min of CPR while wait for fire.      Triage Assessment       Row Name 08/10/23 1803       Triage Assessment (Adult)    Airway WDL WDL       Cardiac WDL    Cardiac WDL chest pain       Peripheral/Neurovascular WDL    Peripheral Neurovascular WDL WDL       Cognitive/Neuro/Behavioral WDL    Cognitive/Neuro/Behavioral WDL WDL

## 2023-08-11 NOTE — CONSULTS
Diagnostic Evaluation Consultation  Crisis Assessment    Patient Name: Magy Garrett  Age:  23 year old  Legal Sex: female  Gender Identity: transgender male  Pronouns:   Race: White  Ethnicity: Not  or   Language: English      Patient was assessed: In person      Patient location: [facility] EMERGENCY DEPT                             ED15    Referral Data and Chief Complaint  Magy Garrett presents to the ED via EMS. Patient is presenting to the ED for the following concerns: Substance use.   Factors that make the mental health crisis life threatening or complex are:  Patient overdosed on fentanyl while [place] the train [place] the [email or web address] He went into cardiac arrest, Narcan was administered, and paramedics performed CPR. He stated that this was not a suicide attempt, but would not have cared had he . He endorsed chronic and persistent passive suicidal ideation..      Informed Consent and Assessment Methods  Explained the crisis assessment process, including applicable information disclosures and limits to confidentiality, assessed understanding of the process, and obtained consent to proceed with the assessment.  Assessment methods included conducting a formal interview with patient, review of medical records, collaboration with medical staff, and obtaining relevant collateral information from family and community providers when available.  : done     Patient response to interventions: needs reinforcement, verbalizes understanding  Coping skills were attempted to reduce the crisis:  Patient spoke with his sober , Heraclio. He attended CD treatment today and voluntarily went to [facility] earlier today with staff for withdrawal concerns.     History of the Crisis   Patient reported an extensive history of trauma and substance abuse. He currently lives in a sober house and attends outpatient chemical dependency treatment. He stated that he started using fentanyl a  few days ago due to exposure of the drug [place]. He reported a history of over 50 overdoses from his drugs of choice (methamphetamine and cocaine).    Brief Psychosocial History  Family:  Single, Children no  Support System:  Parent(s), Other (specify), Facility resident(s)/Staff (Mother lives in [place], [place]. He identified his sober  as his father figure. He reported having some sober friends.)  Employment Status:  employed part-time (He works at [employer]  Source of Income:  salary/wages  Financial Environmental Concerns:     Current Hobbies:  television/movies/videos, music  Barriers in Personal Life:  mental health concerns, emotional concerns    Significant Clinical History  Current Anxiety Symptoms:     Current Depression/Trauma:  crying or feels like crying, negativistic, hoplessness, sadness, thoughts of death/suicide, impaired decision making, low self esteem  Current Somatic Symptoms:     Current Psychosis/Thought Disturbance:     Current Eating Symptoms:     Chemical Use History:  Alcohol: None  Benzodiazepines: None  Opiates: Other (comments) (Fentanyl)  Last Use:: 08/10/23  Cocaine: Other (comments) (Pt reported sobriety from meth and cocaine)  Marijuana: Occasional  Other Use: Methamphetamines  Withdrawal Symptoms: Seizures (pt reported a history of epileptic seizures)   Past diagnosis:  Substance Use Disorder, PTSD  Family history:  No known history of mental health or chemical health concerns  Past treatment:  Inpatient Hospitalization, Residential Treatment, AA/NA  Details of most recent treatment:  Patient is currently attending OP  CD treatment at [facility]. He has also completed treatment at [facility].  Other relevant history:  Pt has a history of mental health admissions when he lived in [place].       Collateral Information  Is there collateral information: Yes     Collateral information name,  "relationship, phone number:  Heraclio (sober house owner), [phone]    What happened today: Fentanyl use this morning, appeared to be overdosing at treatment. Counselor had him go to ER because of seizure history.     What is different about patient's functioning: Gregory had been doing really well with sobriety, but started fentanyl use three days ago. I did not discharge him then, but set strict rules for him to attend treatment.     Concern about alcohol/drug use:      What do you think the patient needs:      Has patient made comments about wanting to kill themselves/others: yes (vague and passive.)    If d/c is recommended, can they take part in safety/aftercare planning:  yes (Heraclio cannot take him back due to using, but wants him safe.)    Additional collateral information:  Heraclio stated, \"I beg you to hold him until Monday. On Monday we will have a place for him, either at [facility] or [facility].\" He feels that if patient were to discharge before they have secured a spot, he would go to the streets and use again.     Risk Assessment  [place] Suicide Severity Rating Scale Full Clinical Version:  Suicidal Ideation  Q6 Suicide Behavior (Lifetime): yes          [place] Suicide Severity Rating Scale Recent:   Suicidal Ideation (Recent)  Q1 Wished to be Dead (Past Month): yes  Q2 Suicidal Thoughts (Past Month): no  Q3 Suicidal Thought Method: no  Q4 Suicidal Intent without Specific Plan: no  Q5 Suicide Intent with Specific Plan: no  Within the Past 3 Months?: no  Level of Risk per Screen: low risk  Intensity of Ideation (Recent)  Most Severe Ideation Rating (Past 1 Month): 1  Description of Most Severe Ideation (Past 1 Month): Pt endorsed chronic and persistent passive suicidal ideation.  Frequency (Past 1 Month): 2-5 times in week  Duration (Past 1 Month): Less than 1 hour/some of the time  Controllability (Past 1 Month): Can control thoughts with some difficulty  Deterrents (Past 1 Month): Uncertain that " deterrents stopped you  Reasons for Ideation (Past 1 Month): Mostly to end or stop the pain (You couldn't go on living with the pain or how you were feeling)  Suicidal Behavior (Recent)  Actual Attempt (Past 3 Months): No  Has subject engaged in non-suicidal self-injurious behavior? (Past 3 Months): No  Interrupted Attempts (Past 3 Months): No  Aborted or Self-Interrupted Attempt (Past 3 Months): No  Preparatory Acts or Behavior (Past 3 Months): No    Environmental or Psychosocial Events: loss of a relationship due to divorce/separation, bullied/abused, loss of a loved one, challenging interpersonal relationships, impulsivity/recklessness, helplessness/hopelessness, other life stressors, worsening chronic illness, anniversary of traumatizing events (Patient had three family deaths in the last year including his father, cousin, and grandma.)  Protective Factors: Protective Factors: strong bond to family unit, community support, or employment, good treatment engagement, able to access care without barriers    Does the patient have thoughts of harming others? Feels Like Hurting Others: no  Previous Attempt to Hurt Others: no  Is the patient engaging in sexually inappropriate behavior?: no    Is the patient engaging in sexually inappropriate behavior?  no        Mental Status Exam   Affect: Appropriate  Appearance: Appropriate  Attention Span/Concentration: Attentive (somnolent at the beginning of assessment.)  Eye Contact: Engaged    Fund of Knowledge: Appropriate   Language /Speech Content: Fluent  Language /Speech Volume: Normal  Language /Speech Rate/Productions: Normal  Recent Memory: Intact  Remote Memory: Intact  Mood: Sad  Orientation to Person: Yes   Orientation to Place: Yes  Orientation to Time of Day: Yes  Orientation to Date: Yes     Situation (Do they understand why they are here?): Yes  Psychomotor Behavior: Normal  Thought Content: Clear, Other (please comment) (passive SI)  Thought Form: Intact    "  Mini-Cog Assessment  Number of Words Recalled:    Clock-Drawing Test:     Three Item Recall:    Mini-Cog Total Score:       Medication  Psychotropic medications:   Medication Orders - Psychiatric (From admission, onward)      None             Current Care Team  Patient Care Team:  Jazmine Rowe MD as PCP - General (Student in organized health care education/training program)    Diagnosis  Patient Active Problem List   Diagnosis Code    Bipolar 2 disorder (H) F31.81    Seizure (H) R56.9    Chemical dependency (H) F19.20    Depressive disorder F32.A    PTSD (post-traumatic stress disorder) F43.10    Insomnia G47.00    Acute paranoia (H) F22    ADHD (attention deficit hyperactivity disorder) F90.9    Suicidal ideation R45.851    Elevated troponin R77.8    Contusion of heart, initial encounter S26.91XA    Opiate overdose, undetermined intent, initial encounter (H) T40.604A       Primary Problem This Admission  Active Hospital Problems    Suicidal ideation      Elevated troponin      Contusion of heart, initial encounter      Opiate overdose, undetermined intent, initial encounter (H)      *PTSD (post-traumatic stress disorder)      Chemical dependency (H)        Clinical Summary and Substantiation of Recommendations   Patient presented [place] the ED [place] He went into cardiac arrest, narcan was administered, and paramedics performed CPR. He stated that he has had a history of over 50 overdoses and that this is his \"second time dying.\" He denied this overdose being a suicide attempt. He endorsed chronic and persisten passive suicidal ideation, and stated that he would have been okay if he had . He is future thinking, wants to continue working, take a friend on a date, and find a new sober house. He does not wish to return to his sober house at this time, but is uncertain where he would saba to go. Per colalteral, he is not allowed back to his sober house as they feel that are " unable to keep him safe. Per collateral, they will attempt to secure a new sober house for him by Monday. Dr. Herman entered the room at the end of the assessment to inform him that he would be admitted medically. Pt agreed to this disposition and is voluntary.                          Patient coping skills attempted to reduce the crisis:  Patient spoke with his sober , Heraclio. He attended CD treatment today and voluntarily went [place] [facility] [place] with staff for withdrawal concerns.    Disposition  Recommended disposition: Substance Abuse Disorder Treatment        Reviewed case and recommendations with attending provider. Attending Name: Dr. Herman       Attending concurs with disposition: yes (Patient is being admitted to a medical unit)       Patient and/or validated legal guardian concurs with disposition:   yes       Final disposition:  medical    Legal status on admission:      Assessment Details   Total duration spent on the patient case in minutes: 45 min     CPT code(s) utilized: 34048 - Psychotherapy for Crisis - 60 (30-74*) min    SULAIMAN Day, Psychotherapist  [facility] - Triage & Transition Services  Callback: [phone]

## 2023-08-11 NOTE — CONSULTS
"Triage and Transition - Consult and Liaison     Magy \"Gregory\" SCARLETT Garrett  August 11, 2023      This note is entered in addition to note by Dr. Radha Pedroza on 8/11 at 9:30AM. Triage and Transition Consult and Liaison has facilitated setting up  sober housing options  for 'Gregory' Magy Garrett providing  sober housing. Gregory has existing outpatient BRIAN programming through [facility].   Sober Housing options outside of [place], as requested by patient, are as follows:       [facility], [facility]   [phone]   [email or web address]   [facility] Romaine [facility]  [phone]  [email or web address]    [facility], multiple locations [phone]  [email or web address]   [facility] [phone]  [email or web address]   [facility] [phone]  [email or web address]     Additionally, [place] [employer] is a resource to locate sober housing options: [email or web address]://[email or web address].org/directory#/       Gregory expressed urgency to this writer of obtaining a phone  in order to pursue housing search. Writer spoke with [facility] to request a  be given to patient, and [facility] stated there was a multi-use  that will be brought to him.     Information has been added to AVS to be provided to patient at discharge.     Ismael Brown [facility], Psychotherapist Trainee  Triage and Transition - Consult and Liaison   [phone]      [facility] maintains an extensive network of licensed behavioral health providers to connect patients with the services they need.  We do not charge providers a fee to participate in our referral network.  We match patients with providers based on a patient s specific treatment needs, insurance coverage, and location. Our first effort will be to refer you to a provider within your care system and will utilize providers outside your care system as " needed.    Health Care Proxy (HCP)

## 2023-08-11 NOTE — PLAN OF CARE
Pt here after unintentional overdose of Fentanyl. A&O x4. Neuros intact with generalized weakness. VSS. Tele SB with PAC's. Regular diet, thin liquids. Takes pills whole with water. Up with SBA. Denies pain. Pt scoring green on the Aggression Stop Light Tool. Plan to transfer to general medical floor with telemetry. Discharge plan pending medical readiness.   Spoke with Hospitalist about removing suicide precautions. Hospitalist deferred decision to Psychiatry. Per Psychiatry note, patient denies being suicidal currently and denies that this was a suicide attempt. Psychiatry signed off on patient (also per their note). Suicide precautions are discontinued.

## 2023-08-11 NOTE — PLAN OF CARE
Neuro:  Alert to drowsy, Oriented x4, PERRLA.  Moves all extremities.  Follows commands.  Behavior: withdrawn, patient adv she has a chronic but passive Suicidal ideations, last attempt was when she was 11 y/o; denies ongoing intent or plans for self harm.     Cardiovascular:  SB when asleep, SR when awake or stimulated; Bps WNL, palpable peripheral pulses   Pulmonary: stable SPO2s on RA, ETCO2 30s, IPI 10s  GI:  regular diet, offered w/ courtesy meal, consumed 75%  :  due to void, denies need to urinate   Endocrine: BG 92  Skin:  Intact  Access/Drips: PIV L AC, Narcan 2.5mg/250 bag @0.6mg/hr     ROUTINE IP LABS (Last four results)  BMP  Recent Labs   Lab 08/11/23  0527 08/11/23  0413 08/10/23  1816     --  141   POTASSIUM 4.2  --  3.5   CHLORIDE 110*  --  106   FREDA 8.8  --  9.4   CO2 18*  --  20*   BUN 11.4  --  12.4   CR 1.08  --  1.24*   GLC 98 92 148*     CBC  Recent Labs   Lab 08/11/23  0527 08/10/23  1816   WBC 11.1* 10.5   RBC 4.94 5.30   HGB 15.2 16.3   HCT 45.7 48.5   MCV 93 92   MCH 30.8 30.8   MCHC 33.3 33.6   RDW 15.3* 15.2*    333     Intake/Output Summary (Last 24 hours) at 8/11/2023 0612  Last data filed at 8/11/2023 0600  Gross per 24 hour   Intake 263 ml   Output --   Net 263 ml

## 2023-08-11 NOTE — PHARMACY-ADMISSION MEDICATION HISTORY
Pharmacist Admission Medication History    Admission medication history is complete. The information provided in this note is only as accurate as the sources available at the time of the update.    Medication reconciliation/reorder completed by provider prior to medication history? No    Information Source(s): Patient, Clinic records, and CareEverywhere/SureScripts via in-person    Pertinent Information: Currently on week 3 of lamotrigine schedule    Changes made to PTA medication list:  Added: lamotrigine, topiramate, testosterone  Deleted: gabapentin, hydroxyzine  Changed: None    Medication Affordability:  Not including over the counter (OTC) medications, was there a time in the past 3 months when you did not take your medications as prescribed because of cost?: No    Allergies reviewed with patient and updates made in EHR: yes    Medication History Completed By: Aracelis Persaud RP 8/10/2023 10:46 PM    Prior to Admission medications    Medication Sig Last Dose Taking? Auth Provider Long Term End Date   emtricitabine-tenofovir (TRUVADA) 200-300 MG per tablet Take 1 tablet by mouth daily at 2 pm 8/10/2023 Yes Reported, Patient Yes    lamoTRIgine (LAMICTAL) 25 MG tablet Take ONE pill by mouth, ONE time per day for 7 days.  THEN, take ONE pill in AM and ONE pill in PM for 7 days.  THEN, take TWO pills in AM, and ONE pill in PM for 7 days.  THEN, take TWO pills in AM and TWO pills in PM for 7 days.  THEN, take THREE pills in AM and TWO pills in PM for 7 days.  THEN, take THREE pills in AM and THREE pills in PM for 7 days.  THEN, take FOUR pills in AM and THREE pills in PM for 7 days.  THEN, take FOUR pills in AM and FOUR pills in PM for 7 days.  ** THEN, continue taking 4 tablets (100mg) twice 8/10/2023 at pm Yes Reported, Patient Yes 3/25/24   testosterone cypionate (DEPOTESTOSTERONE) 100 MG/ML injection Inject 0.25 mLs into the muscle once a week 8/8/2023 Yes Unknown, Entered By History Yes    topiramate (TOPAMAX) 50  MG tablet 100 every morning and 50 mg every evening for 7 days (started Sunday 8/6), then 100 mg twice daily thereafter 8/10/2023 at pm Yes Unknown, Entered By History Yes

## 2023-08-11 NOTE — CONSULTS
"[facility]    Neurology Consultation     Date of Admission:  8/10/2023    Assessment & Plan   Magy Garrett is a 23 year old adult who was admitted on 8/10/2023. I was asked to see the patient for seizure disorder.  The patient has history of seizures uncertain type uncertain if the episodes of loss of consciousness are not related to drug abuse or syncope.  EEG done on 2023 was normal.  If suspected that Lamictal produce suicidal ideation I would agree to discontinuation of this drug.  I recommend    -Continue on the dose of Topamax of 100 mg twice a day, will check a level  -I would recommend 1 hour video EEG monitoring to look for epileptiform activity.    We will follow-up with you    Yamila Martinez MD    Code Status    Full Code    Reason for Consult   Reason for consult: I was asked by Dr Sen to evaluate this patient for seizure disorder.    Primary Care Physician   Jazmine Rowe    Chief Complaint   Overdose on fentanyl    The history was obtained from the patient but also by reading the chart.    History of Present Illness   Magy Garrett is a 23 year old adult who presents with overdose on fentanyl.  The patient is actually in program for detox and however he has been using fentanyl for the last 3 days.  The patient per se states that he took the fentanyl to get high and not in a suicidal attempt.  There is a note in the chart that mentioned that the patient was joking about overdosing but showed also the Narcan in in his backpack.  The patient states that he remembers receiving CPR twice.  He tells me he \" twice\".  He received CPR from bystanders or EMS.  He was given Narcan, he will wake up after it but then become again drowsy.  He was found to have elevated troponins.    The patient has also history of for possible seizure disorder.  He tells me that the last seizure was last Monday or Tuesday.  He tells me that he started to have seizures about a " "year ago and he has had overall 10 or 15 generalized tonicoclonic.  He actually states that he can feel a seizure, he will feel that his arms will crawl and he will feel jolts in the arms and the legs will be stiff, this will be at times preceded by pain and jolts in the back of the head, throbbing pain.  The patient states that he remembers all of the above.  When he feels the pain he will put himself on the floor.  He denies ever getting injured during a seizure.      However he states that in 2021 he was dehydrated and fainted and he did the ground with the occipital region and was diagnosed with a concussion.    The patient was evaluated by neurology at [facility]:  \"Seizure onset 2/2022 overdose cocaine back of pick-up truck. Not sure what happened, amnestic to event. Described as unresponsive.   Has episodes daily where his body will tense, twitching, awake for the most part, recalls the events. Usually when he falls asleep or when he is anxious.   In rehab from May until a week ago- was put on antiseizure medication Lamictal refused to take it. He thought it was for mood stabilizing and did not know it was for seizure. He did not take it when he got home.   Saturday he went to hospital and did not leave with any medication. He had another seizure Sunday then was discharged with Lamictal which he has been taking. He denies seizures since restarting the medication.   Seizure described as unresponsive, unconscious, could not stay awake. Twitching when going to sleep started when he was 16.   He said he is on \"a lot of medication\", but does no have a list.   He is on Gabapentin (craving), Prep, compazine (bulimia), hydroxizine as needed (anxiety), ibuprofen - headache and shattered heel (left) and right knee injury.   Headaches - has a h/o concussion 2/2021   Base of skull tightness and throbbing, + photophobia, mild phonophobia, +nausea, + dizziness + blurry vision. Takes ibuprofen without full relief. " "Constant headache after seizures. Before that 2x per week.\"    During the visit with her neurologist the possibility of an idiopathic epilepsy was raised the patient was started on topiramate and Lamictal.  There was a taper up of lamotrigine.  The patient does not know the dose he was taking at home however he states that he was on week 3.  On week 3 of lamotrigine he was supposed to take 50 in the morning and 25 at bedtime.  Timewise this seems accurate.    1 he was admitted to our facility Lamictal was stopped due to questionable risk for suicidal due to the drug.    Past Medical History   Tobacco use disorder  Gender dysphoria  Methamphetamine and cocaine use disorder in remission  Narcotic use disorder; I see mention of suspected narcotic dependence with withdrawal concern through sob        Past Surgical History   I have reviewed this patient's surgical history and updated it with pertinent information if needed.  No past surgical history on file.    Prior to Admission Medications   Prior to Admission Medications   Prescriptions Last Dose Informant Patient Reported? Taking?   emtricitabine-tenofovir (TRUVADA) 200-300 MG per tablet 8/10/2023  Yes Yes   Sig: Take 1 tablet by mouth daily at 2 pm   lamoTRIgine (LAMICTAL) 25 MG tablet 8/10/2023 at pm  Yes Yes   Sig: Take ONE pill by mouth, ONE time per day for 7 days.  THEN, take ONE pill in AM and ONE pill in PM for 7 days.  THEN, take TWO pills in AM, and ONE pill in PM for 7 days.  THEN, take TWO pills in AM and TWO pills in PM for 7 days.  THEN, take THREE pills in AM and TWO pills in PM for 7 days.  THEN, take THREE pills in AM and THREE pills in PM for 7 days.  THEN, take FOUR pills in AM and THREE pills in PM for 7 days.  THEN, take FOUR pills in AM and FOUR pills in PM for 7 days.  ** THEN, continue taking 4 tablets (100mg) twice   testosterone cypionate (DEPOTESTOSTERONE) 100 MG/ML injection 8/8/2023  Yes Yes   Sig: Inject 0.25 mLs into the " muscle once a week   topiramate (TOPAMAX) 50 MG tablet 8/10/2023 at pm  Yes Yes   Si every morning and 50 mg every evening for 7 days (started ), then 100 mg twice daily thereafter      Facility-Administered Medications: None     Allergies   Allergies   Allergen Reactions    Lactose Unknown    Latex      Nitrile gloves     Pork Allergy Unknown     Spiritism    Gluten Meal        Social History   I have reviewed this patient's social history and updated it with pertinent information if needed. Magy Garrett  reports current alcohol use. He reports current drug use. Drugs: Cocaine and Methamphetamines.    Family History   I have reviewed this patient's family history and updated it with pertinent information if needed.   No family history on file.    Review of Systems   The 10 point Review of Systems is negative other than noted in the HPI or here.     Physical Exam   Temp: 97.9  F (36.6  C) Temp src: Temporal BP: 115/54 Pulse: (!) 45   Resp: 11 SpO2: 97 % O2 Device: None (Room air)    Vital Signs with Ranges  Temp:  [97.9  F (36.6  C)-98.3  F (36.8  C)] 97.9  F (36.6  C)  Pulse:  [40-86] 45  Resp:  [7-24] 11  BP: ()/(54-78) 115/54  SpO2:  [96 %-100 %] 97 %  141 lbs 5.04 oz    Constitutional: Normal  Eyes: No conjunctival erythema  ENT: Neck is supple  Respiratory: CTA  Cardiovascular: RRR  Skin: No obvious lesions  Musculoskeletal: No pedal edema  The patient is alert oriented x3 no acute distress.  Speech is fluent there is no aphasia apraxia or agnosia.   Pupils are equal round reactive to light extraocular movements are intact, there is no nystagmus.  Facial muscles are equal bilaterally.  Uvula and tongue are midline.     Muscular mass tone and strength are normal in all 4 extremities there is no pronator drift.  Reflexes are present symmetric in upper and lower extremities.  Babinski is absent.    Sensory exam is normal to light touch and vibratory sense.    Finger-nose-finger without  dysmetria.  Fine movements are normal.    Gait was deferred  neuropsychiatric: Somewhat depressed  Data   Results for orders placed or performed during the hospital encounter of 08/10/23 (from the past 24 hour(s))   [place] Draw    Narrative    The following orders were created for panel order [place] Draw.  Procedure                               Abnormality         Status                     ---------                               -----------         ------                     Extra Blue Top Tube[[id number]]                              Final result               Extra Red Top Tube[[id number]]                               Final result               Extra Green Top (Lithium...[[id number]]                      Final result               Extra Purple Top Tube[[id number]]                            Final result                 Please view results for these tests on the individual orders.   Extra Blue Top Tube   Result Value Ref Range    Hold Specimen JIC    Extra Red Top Tube   Result Value Ref Range    Hold Specimen JIC    Extra Green Top (Lithium Heparin) Tube   Result Value Ref Range    Hold Specimen JIC    Extra Purple Top Tube   Result Value Ref Range    Hold Specimen JIC    CBC with platelets differential    Narrative    The following orders were created for panel order CBC with platelets differential.  Procedure                               Abnormality         Status                     ---------                               -----------         ------                     CBC with platelets and d...[[id number]]  Abnormal            Final result                 Please view results for these tests on the individual orders.   Comprehensive metabolic panel   Result Value Ref Range    Sodium 141 136 - 145 mmol/L    Potassium 3.5 3.4 - 5.3 mmol/L    Chloride 106 98 - 107 mmol/L    Carbon Dioxide (CO2) 20 (L) 22 - 29 mmol/L    Anion Gap 15 7 - 15 mmol/L    Urea Nitrogen 12.4 6.0 - 20.0 mg/dL    Creatinine 1.24 (H) 0.51 -  "1.17 mg/dL    Calcium 9.4 8.6 - 10.0 mg/dL    Glucose 148 (H) 70 - 99 mg/dL    Alkaline Phosphatase 125 35 - 129 U/L    AST 25 0 - 45 U/L    ALT 26 0 - 70 U/L    Protein Total 7.5 6.4 - 8.3 g/dL    Albumin 4.7 3.5 - 5.2 g/dL    Bilirubin Total 0.6 <=1.2 mg/dL    GFR Estimate 62 >60 mL/min/1.73m2    Narrative    The generation of reference intervals for this test is currently based on binary male or female sex. If the electronic health record information indicates another gender identity or if Legal Sex is recorded as \"Unknown\", both male and female reference intervals are provided where applicable, and should be considered according to the individual's appropriate clinical context.   Troponin T, High Sensitivity   Result Value Ref Range    Troponin T, High Sensitivity 19 <=22 ng/L   Ethyl Alcohol Level   Result Value Ref Range    Alcohol ethyl <0.01 <=0.01 g/dL   Salicylate level   Result Value Ref Range    Salicylate <0.3   mg/dL   Acetaminophen level   Result Value Ref Range    Acetaminophen <5.0 (L) 10.0 - 30.0 ug/mL   CBC with platelets and differential   Result Value Ref Range    WBC Count 10.5 4.0 - 11.0 10e3/uL    RBC Count 5.30 3.80 - 5.90 10e6/uL    Hemoglobin 16.3 11.7 - 17.7 g/dL    Hematocrit 48.5 35.0 - 53.0 %    MCV 92 78 - 100 fL    MCH 30.8 26.5 - 33.0 pg    MCHC 33.6 31.5 - 36.5 g/dL    RDW 15.2 (H) 10.0 - 15.0 %    Platelet Count 333 150 - 450 10e3/uL    % Neutrophils 82 %    % Lymphocytes 11 %    % Monocytes 5 %    % Eosinophils 1 %    % Basophils 1 %    % Immature Granulocytes 0 %    NRBCs per 100 WBC 0 <1 /100    Absolute Neutrophils 8.7 (H) 1.6 - 8.3 10e3/uL    Absolute Lymphocytes 1.1 0.8 - 5.3 10e3/uL    Absolute Monocytes 0.6 0.0 - 1.3 10e3/uL    Absolute Eosinophils 0.1 0.0 - 0.7 10e3/uL    Absolute Basophils 0.1 0.0 - 0.2 10e3/uL    Absolute Immature Granulocytes 0.0 <=0.4 10e3/uL    Absolute NRBCs 0.0 10e3/uL    Narrative    The generation of reference intervals for this test is currently " "based on binary male or female sex. If the electronic health record information indicates another gender identity or if Legal Sex is recorded as \"Unknown\", both male and female reference intervals are provided where applicable, and should be considered according to the individual's appropriate clinical context.   EKG 12-lead, tracing only   Result Value Ref Range    Systolic Blood Pressure  mmHg    Diastolic Blood Pressure  mmHg    Ventricular Rate 59 BPM    Atrial Rate 59 BPM    DC Interval 166 ms    QRS Duration 96 ms     ms    QTc 368 ms    P Axis 65 degrees    R AXIS 73 degrees    T Axis 60 degrees    Interpretation ECG       Sinus bradycardia with sinus arrhythmia  Cannot rule out Anterior infarct , age undetermined  Abnormal ECG  No previous ECGs available  Confirmed by GENERATED REPORT, COMPUTER ([id number]),  Ivis Nicholson ([id number]) on 8/10/2023 8:38:13 PM     XR Chest 2 Views    Narrative    EXAM: XR CHEST 2 VIEWS  LOCATION: [facility]  DATE: 8/10/2023    INDICATION: chest pain s p overdose and chest compressions  COMPARISON: None.      Impression    IMPRESSION: Heart is normal in size. Lungs are clear.   Troponin T, High Sensitivity   Result Value Ref Range    Troponin T, High Sensitivity 68 (H) <=22 ng/L   Troponin T, High Sensitivity   Result Value Ref Range    Troponin T, High Sensitivity 46 (H) <=22 ng/L   Glucose by meter   Result Value Ref Range    GLUCOSE BY METER POCT 92 70 - 99 mg/dL   Comprehensive metabolic panel   Result Value Ref Range    Sodium 140 136 - 145 mmol/L    Potassium 4.2 3.4 - 5.3 mmol/L    Chloride 110 (H) 98 - 107 mmol/L    Carbon Dioxide (CO2) 18 (L) 22 - 29 mmol/L    Anion Gap 12 7 - 15 mmol/L    Urea Nitrogen 11.4 6.0 - 20.0 mg/dL    Creatinine 1.08 0.51 - 1.17 mg/dL    Calcium 8.8 8.6 - 10.0 mg/dL    Glucose 98 70 - 99 mg/dL    Alkaline Phosphatase 104 35 - 129 U/L    AST 23 0 - 45 U/L    ALT 20 0 - 70 U/L    Protein Total 6.3 (L) 6.4 - 8.3 " "g/dL    Albumin 3.9 3.5 - 5.2 g/dL    Bilirubin Total 0.8 <=1.2 mg/dL    GFR Estimate 73 >60 mL/min/1.73m2    Narrative    The generation of reference intervals for this test is currently based on binary male or female sex. If the electronic health record information indicates another gender identity or if Legal Sex is recorded as \"Unknown\", both male and female reference intervals are provided where applicable, and should be considered according to the individual's appropriate clinical context.   CBC with platelets   Result Value Ref Range    WBC Count 11.1 (H) 4.0 - 11.0 10e3/uL    RBC Count 4.94 3.80 - 5.90 10e6/uL    Hemoglobin 15.2 11.7 - 17.7 g/dL    Hematocrit 45.7 35.0 - 53.0 %    MCV 93 78 - 100 fL    MCH 30.8 26.5 - 33.0 pg    MCHC 33.3 31.5 - 36.5 g/dL    RDW 15.3 (H) 10.0 - 15.0 %    Platelet Count 317 150 - 450 10e3/uL    Narrative    The generation of reference intervals for this test is currently based on binary male or female sex. If the electronic health record information indicates another gender identity or if Legal Sex is recorded as \"Unknown\", both male and female reference intervals are provided where applicable, and should be considered according to the individual's appropriate clinical context.   Blood gas venous   Result Value Ref Range    pH Venous 7.37 7.32 - 7.43    pCO2 Venous 33 (L) 40 - 50 mm Hg    pO2 Venous 87 (H) 25 - 47 mm Hg    Bicarbonate Venous 19 (L) 21 - 28 mmol/L    Base Excess/Deficit (+/-) -5.3 -7.7 - 1.9 mmol/L    FIO2 0    Glucose by meter   Result Value Ref Range    GLUCOSE BY METER POCT 91 70 - 99 mg/dL   Echocardiogram Complete    Narrative    [id number]  [id number]  [id number]  [id number]^JOHNS^JERSEY^[facility]  Echocardiography Laboratory  [address] [place] [address]     Name: ELIZABETH DRAPER  MRN: [id number]  : 2000  Study Date: 2023 09:47 AM  Age: 23 yrs  Gender: Female  Patient Location: [facility]  Reason For Study: " Cardiac Arrest, Cardiorespiratory Failure  Ordering Physician: JERSEY JOHNS  Referring Physician: JONA HARP  Performed By: Michelle Preciado     BSA: 1.7 m2  Height: 63 in  Weight: 141 lb  HR: 44  BP: 124/74 mmHg  ______________________________________________________________________________  Procedure  Complete Echo Adult.  ______________________________________________________________________________  Interpretation Summary     Normal transthoracic echocardiogram.  ______________________________________________________________________________  Left Ventricle  The left ventricle is normal in size. There is normal left ventricular wall  thickness. Left ventricular systolic function is normal. Left ventricular  diastolic function is normal. Normal left ventricular wall motion. No evidence  of left ventricular mass or tumors.     Right Ventricle  The right ventricle is normal in structure, function and size.     Atria  Normal left atrial size. Right atrial size is normal.     Mitral Valve  The mitral valve leaflets appear normal. There is no evidence of stenosis,  fluttering, or prolapse. There is trace mitral regurgitation.     Tricuspid Valve  Normal tricuspid valve. The right ventricular systolic pressure is  approximated at 23mmHg plus the right atrial pressure. Right ventricular  systolic pressure is normal.     Aortic Valve  Normal tricuspid aortic valve.     Pulmonic Valve  Normal pulmonic valve.     Vessels  The aortic root is normal size. The inferior vena cava is normal.     Pericardium  The pericardium appears normal.     Rhythm  Sinus rhythm was noted.  ______________________________________________________________________________  MMode/2D Measurements & Calculations  IVSd: 0.77 cm     LVIDd: 4.3 cm  LVIDs: 3.1 cm  LVPWd: 0.81 cm  FS: 28.5 %  LV mass(C)d: 105.6 grams  LV mass(C)dI: 63.3 grams/m2  Ao root diam: 2.6 cm  asc Aorta Diam: 2.5 cm  LVOT diam: 1.9 cm  LVOT area: 3.0 cm2  LA Volume (BP):  24.6 ml  LA Volume Index (BP): 14.7 ml/m2  RWT: 0.37     Doppler Measurements & Calculations  MV E max yinka: 106.0 cm/sec  MV A max yinka: 29.5 cm/sec  MV E/A: 3.6  MV dec time: 0.24 sec  Ao V2 max: 143.0 cm/sec  Ao max P.0 mmHg  Ao V2 mean: 93.7 cm/sec  Ao mean P.0 mmHg  Ao V2 VTI: 30.8 cm  SILAS(I,D): 2.4 cm2  SILAS(V,D): 2.6 cm2  LV V1 max P.3 mmHg  LV V1 max: 125.0 cm/sec  LV V1 VTI: 25.1 cm  SV(LVOT): 74.9 ml  SI(LVOT): 44.9 ml/m2  PA V2 max: 100.1 cm/sec  PA max P.0 mmHg  PA acc time: 0.12 sec  AV Yinka Ratio (DI): 0.87  SILAS Index (cm2/m2): 1.5  E/E' av.6  Lateral E/e': 5.5  Medial E/e': 7.7  RV S Yinka: 9.5 cm/sec     ______________________________________________________________________________  Report approved by: Esdras Loving 2023 11:08 AM         Glucose by meter   Result Value Ref Range    GLUCOSE BY METER POCT 92 70 - 99 mg/dL

## 2023-08-11 NOTE — ED NOTES
3 doses of narcan so far. Pt wakes up momentarily right after narcan and RR improves but pt continues somnolent.

## 2023-08-11 NOTE — CONSULTS
Initial Psychiatric Consult   Consult date: 2023         Reason for Consult, requesting source:    Suicidal ideation    Requesting source: Clarke Armani Sen    Labs and imaging reviewed. Patient seen and evaluated by Radha Pedroza MD          HPI:   This is a 23-year-old transgender male who presented to the emergency department after he overdosed on fentanyl while riding the light rail.  Patient has a history of methamphetamine and cocaine use, but has recently been using fentanyl.  He apparently has been living in a sober house for months and has been thought to be intoxicated at his treatment meetings.  Patient received multiple doses of Narcan and CPR at the scene.  Hospitalist indicates this was likely respiratory  distress, not acute coronary syndrome.    Patient indicated he was disappointed when he woke up to EMS shouting at him and indicated that he had  and was brought back.  He has a history of suicidality, and was recently started on Lamictal.  Reportedly he is worse on the Lamictal.  The Lamictal is for seizure disorder.    I did see the patient today, and discussed with RN.  Patient denies that he is suicidal currently, and also denies this was a suicide attempt.  He states that it may have been somewhat of a passive death wish, because he was upset when he woke up.  Patient does not want to go to the inpatient psychiatric unit, and states he has been hospitalized 15 times in the past and that it does not really help.  He denies that he is in imminent danger of harm to himself.  He does feel like he needs some help figuring out next steps.  He does not know where he is going to go from here, and wants to go to a sober living arrangement.        Past Psychiatric History:   Patient was seen at [facility] on 2023, and 2023.  Patient reportedly attempted to hang himself with CD treatment and was brought to the ED with marks noted on his neck.  He reported this was related to  being bullied at [employer], so he tried to kill himself.  Patient reports multiple past psychiatric admissions in [place], with 15 reported prior hospitalizations.  Patient reportedly has a history of eating disorder        Substance Use and History:   Has a history of cocaine and methamphetamine use disorder.  Reportedly, he is sober from the substances but uses fentanyl.  He has completed treatment in a prior [place], and is currently receiving outpatient CD treatment at [facility], and living in sober housing.          Past Medical History:   PAST MEDICAL HISTORY: No past medical history on file.    Patient indicated during a neurology office visit that the onset of seizure was in February 2022 after the patient overdosed on cocaine in the back of a pickup truck.  Now he is reporting daily episodes where he will tense, twitching, but is awake and recalls the events.  Patient had refused to take Lamictal from May through July while he was in treatment,  Because he reportedly thought that it was a mood stabilizer, not on antiseizure medication.  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  EEG performed 7/24/2023 was normal.    MRI brain with and without contrast performed on 8/8/2023 showed multiple foci of abnormal T2 signal in the periventricular and deep cerebral white matter with a somewhat elongated appearance along the pericallosal white matter raising the question of a demyelinating disorder.  Correlation with CSF analysis was recommended.    PAST SURGICAL HISTORY: No past surgical history on file.          Family History:   FAMILY HISTORY: No family history on file.    Family Psychiatric History:         Social History:   SOCIAL HISTORY:   Social History     Tobacco Use    Smoking status: Not on file    Smokeless tobacco: Not on file   Substance Use Topics    Alcohol use: Yes     Comment: last drink May 24, 2023       Patient states that he would  ultimately like to leave [place], and does not like being here.         Physical ROS:   The 10 point Review of Systems is negative other than noted in the HPI or here.           Medications:      emtricitabine-tenofovir  1 tablet Oral Daily    lidocaine  2 patch Transdermal Q24H    sodium chloride (PF)  3 mL Intracatheter Q8H    sodium chloride (PF)  3 mL Intracatheter Q8H    topiramate  100 mg Oral QAM    [START ON 8/13/2023] topiramate  100 mg Oral QPM    topiramate  50 mg Oral QPM              Allergies:     Allergies   Allergen Reactions    Lactose Unknown    Latex      Nitrile gloves     Pork Allergy Unknown     Oriental orthodox    Gluten Meal           Labs:     Recent Results (from the past 48 hour(s))   Extra Blue Top Tube    Collection Time: 08/10/23  6:16 PM   Result Value Ref Range    Hold Specimen JIC    Extra Red Top Tube    Collection Time: 08/10/23  6:16 PM   Result Value Ref Range    Hold Specimen JIC    Extra Green Top (Lithium Heparin) Tube    Collection Time: 08/10/23  6:16 PM   Result Value Ref Range    Hold Specimen JIC    Extra Purple Top Tube    Collection Time: 08/10/23  6:16 PM   Result Value Ref Range    Hold Specimen JIC    Comprehensive metabolic panel    Collection Time: 08/10/23  6:16 PM   Result Value Ref Range    Sodium 141 136 - 145 mmol/L    Potassium 3.5 3.4 - 5.3 mmol/L    Chloride 106 98 - 107 mmol/L    Carbon Dioxide (CO2) 20 (L) 22 - 29 mmol/L    Anion Gap 15 7 - 15 mmol/L    Urea Nitrogen 12.4 6.0 - 20.0 mg/dL    Creatinine 1.24 (H) 0.51 - 1.17 mg/dL    Calcium 9.4 8.6 - 10.0 mg/dL    Glucose 148 (H) 70 - 99 mg/dL    Alkaline Phosphatase 125 35 - 129 U/L    AST 25 0 - 45 U/L    ALT 26 0 - 70 U/L    Protein Total 7.5 6.4 - 8.3 g/dL    Albumin 4.7 3.5 - 5.2 g/dL    Bilirubin Total 0.6 <=1.2 mg/dL    GFR Estimate 62 >60 mL/min/1.73m2   Troponin T, High Sensitivity    Collection Time: 08/10/23  6:16 PM   Result Value Ref Range    Troponin T, High Sensitivity 19 <=22 ng/L   Ethyl  Alcohol Level    Collection Time: 08/10/23  6:16 PM   Result Value Ref Range    Alcohol ethyl <0.01 <=0.01 g/dL   Salicylate level    Collection Time: 08/10/23  6:16 PM   Result Value Ref Range    Salicylate <0.3   mg/dL   Acetaminophen level    Collection Time: 08/10/23  6:16 PM   Result Value Ref Range    Acetaminophen <5.0 (L) 10.0 - 30.0 ug/mL   CBC with platelets and differential    Collection Time: 08/10/23  6:16 PM   Result Value Ref Range    WBC Count 10.5 4.0 - 11.0 10e3/uL    RBC Count 5.30 3.80 - 5.90 10e6/uL    Hemoglobin 16.3 11.7 - 17.7 g/dL    Hematocrit 48.5 35.0 - 53.0 %    MCV 92 78 - 100 fL    MCH 30.8 26.5 - 33.0 pg    MCHC 33.6 31.5 - 36.5 g/dL    RDW 15.2 (H) 10.0 - 15.0 %    Platelet Count 333 150 - 450 10e3/uL    % Neutrophils 82 %    % Lymphocytes 11 %    % Monocytes 5 %    % Eosinophils 1 %    % Basophils 1 %    % Immature Granulocytes 0 %    NRBCs per 100 WBC 0 <1 /100    Absolute Neutrophils 8.7 (H) 1.6 - 8.3 10e3/uL    Absolute Lymphocytes 1.1 0.8 - 5.3 10e3/uL    Absolute Monocytes 0.6 0.0 - 1.3 10e3/uL    Absolute Eosinophils 0.1 0.0 - 0.7 10e3/uL    Absolute Basophils 0.1 0.0 - 0.2 10e3/uL    Absolute Immature Granulocytes 0.0 <=0.4 10e3/uL    Absolute NRBCs 0.0 10e3/uL   EKG 12-lead, tracing only    Collection Time: 08/10/23  7:16 PM   Result Value Ref Range    Systolic Blood Pressure  mmHg    Diastolic Blood Pressure  mmHg    Ventricular Rate 59 BPM    Atrial Rate 59 BPM    KY Interval 166 ms    QRS Duration 96 ms     ms    QTc 368 ms    P Axis 65 degrees    R AXIS 73 degrees    T Axis 60 degrees    Interpretation ECG       Sinus bradycardia with sinus arrhythmia  Cannot rule out Anterior infarct , age undetermined  Abnormal ECG  No previous ECGs available  Confirmed by GENERATED REPORT, COMPUTER ([id number]),  Ivis Nicholson ([id number]) on 8/10/2023 8:38:13 PM     Troponin T, High Sensitivity    Collection Time: 08/10/23  8:36 PM   Result Value Ref Range    Troponin T, High  "Sensitivity 68 (H) <=22 ng/L   Troponin T, High Sensitivity    Collection Time: 08/11/23 12:19 AM   Result Value Ref Range    Troponin T, High Sensitivity 46 (H) <=22 ng/L   Glucose by meter    Collection Time: 08/11/23  4:13 AM   Result Value Ref Range    GLUCOSE BY METER POCT 92 70 - 99 mg/dL   Comprehensive metabolic panel    Collection Time: 08/11/23  5:27 AM   Result Value Ref Range    Sodium 140 136 - 145 mmol/L    Potassium 4.2 3.4 - 5.3 mmol/L    Chloride 110 (H) 98 - 107 mmol/L    Carbon Dioxide (CO2) 18 (L) 22 - 29 mmol/L    Anion Gap 12 7 - 15 mmol/L    Urea Nitrogen 11.4 6.0 - 20.0 mg/dL    Creatinine 1.08 0.51 - 1.17 mg/dL    Calcium 8.8 8.6 - 10.0 mg/dL    Glucose 98 70 - 99 mg/dL    Alkaline Phosphatase 104 35 - 129 U/L    AST 23 0 - 45 U/L    ALT 20 0 - 70 U/L    Protein Total 6.3 (L) 6.4 - 8.3 g/dL    Albumin 3.9 3.5 - 5.2 g/dL    Bilirubin Total 0.8 <=1.2 mg/dL    GFR Estimate 73 >60 mL/min/1.73m2   CBC with platelets    Collection Time: 08/11/23  5:27 AM   Result Value Ref Range    WBC Count 11.1 (H) 4.0 - 11.0 10e3/uL    RBC Count 4.94 3.80 - 5.90 10e6/uL    Hemoglobin 15.2 11.7 - 17.7 g/dL    Hematocrit 45.7 35.0 - 53.0 %    MCV 93 78 - 100 fL    MCH 30.8 26.5 - 33.0 pg    MCHC 33.3 31.5 - 36.5 g/dL    RDW 15.3 (H) 10.0 - 15.0 %    Platelet Count 317 150 - 450 10e3/uL          Physical and Psychiatric Examination:     /62   Pulse (!) 44   Temp 97.9  F (36.6  C)   Resp 12   Ht 1.6 m (5' 3\")   Wt 64.1 kg (141 lb 5 oz)   SpO2 98%   BMI 25.03 kg/m    Weight is 141 lbs 5.04 oz  Body mass index is 25.03 kg/m .    Physical Exam:  I have reviewed the physical exam as documented by by the medical team and agree with findings and assessment and have no additional findings to add at this time.    Mental Status Exam:    Appearance: awake, alert, adequately groomed, appeared as age stated, awake, alert, cooperative, and no apparent distress  Attitude:  cooperative  Eye Contact:  good  Mood:  " better  Affect:  appropriate and in normal range and mood congruent  Speech:  clear, coherent  Language: Fluent in english   Psychomotor Behavior:  no evidence of tardive dyskinesia, dystonia, or tics  Thought Process:  logical  Associations:  no loose associations  Thought Content:  no evidence of suicidal ideation or homicidal ideation, no evidence of psychotic thought, no auditory hallucinations present, and no visual hallucinations present  Insight:  good  Judgement:  intact  Oriented to:  time, person, and place  Attention Span and Concentration:  intact  Recent and Remote Memory:  intact  Fund of Knowledge: Appropriate   Gait and Station:                DSM-5 Diagnosis:   Cocaine and methamphetamine use disorder, severe, dependence    Fentanyl abuse    Fentanyl overdose, unintentional, subsequent encounter              Assessment:   This is a 23-year-old male transgender patient who presents after an unintentional fentanyl overdose.  Patient states he was trying to get high, and OD'd.  He was disappointed when he was revived, but denies wanting to kill himself.  He does have a pretty significant mental health history, though difficult to know how this may be intertwined with substance use disorder.  He is sober from cocaine and methamphetamine use currently.    Patient wants to go back to sober living arrangement.  Will asked someone from our service to help coordinate this with him.  He does not want to go to inpatient psychiatry and does not meet criteria.  He is also not holdable or committable at this point.  Explained to the patient that he will remain at risk as long as he continues to use, and encouraged him to continue with pursuing recovery.          Summary of Recommendations:   1.  Patient is okay to discharge from a psychiatric standpoint when medically clear.    2.  Will ask our service to evaluate the patient, and assist with disposition recommendations.    3.  Psychiatry will sign off.      Radha  Yovany BRYANT  Consult/Liaison Psychiatry and Addiction Psychiatry  [facility]

## 2023-08-11 NOTE — PROGRESS NOTES
[facility]    Hospitalist Progress Note    Brief Summary:  This is a 23-year-old transgender male, history of meth and cocaine abuse, history of seizure disorder, bipolar disorder, PTSD, chemical dependency, recently in rehab and currently in outpatient treatment for his meth and cocaine abuse, started using fentanyl and was admitted with fentanyl overdose and neck respiratory failure.  He does have history of suicidal ideation suicide attempts in the past.      Assessment & Plan     Acute respiratory arrest secondary to fentanyl overdose  Fentanyl overdose on fentanyl abuse  History of meth and cocaine abuse  Suicidal ideation  Musculoskeletal chest pain   Smoking fentanyl on the light rail when he overdosed, apparently received cardiopulmonary resuscitation and Narcan administration.  He received multiple doses of Narcan, does get improved, and then somnolent again.  Subsequently was started on Narcan infusion and admitted to the ICU.  Patient told me that his drug of choice is meth and cocaine but has been sober and have inpatient treatment.  She started using fentanyl for the last 3 days only.  He was found intoxicated during one of the meeting for his chemical dependency.    At this time is awake alert and oriented, having some chest pain because of his CPR.  He is not on any long-acting opiates so I will discontinue the Narcan infusion at this time.  Check VBG with no hypercapnia at this time.  Patient is currently on room air.  Monitor for few hours after stopping the Narcan if remains stable we will move him out of the ICU.    Keep him on suicidal precaution.  Psychiatry is consulted and appreciate their input.    Belongings locked up by security  -Bedside sitter while on Narcan     Troponin elevation:  Very mildly elevated troponin initial 19, was 68 and then 46.  This is likely secondary to CPR, and unlikely secondary to ACS.  He is musculoskeletal chest pain which is reproducible  on exam at this time.  Echocardiogram order, will follow the results.  If there is any abnormality will consult cardiology.  Continue lidocaine patches for chest pain and schedule Tylenol 1000 mg 3 times a day.  Avoid narcotics    Epilepsy:   Initial onset of seizures in  following overdose.  Has followed with neurology as an outpatient and has an upcoming appointment with [facility] neurology.  Patient is on Topamax as well as lamotrigine prior to admission.  History of seizures last Monday, mostly happen with been noncompliant with his medication.  Will do levels out of Topamax and lamotrigine.  Keeping the seizures precautions  Consult neurology to evaluate the patient.    Lamotrigine is put on hold by the admitting physician as concern for increased suicidal ideation since being on this medication.  Neurology is consulted will await further recommendations.        Passive suicidal ideation: Reports that he has no active suicidal or homicidal ideation, but was hoping that he would not be resuscitated if he .  -Suicide precautions, bedside sitter while on Narcan drip  -Psychiatry consulted  -Holding prior to admission Lamictal as he reports increased suicidal ideation since beginning this medication     Tobacco use disorder in recent remission: Quit smoking cigarettes 3 weeks ago.  Previously had been smoking since age 12 from 1 to 4 packs/day.  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      Suicidal precaution  Seizure precautions  Discontinue naloxone infusion  Observed for few hours in the ICU if if remains stable we will move him to the floor.  Psych and neurology  consulted    Discussed with the patient and the nursing staff during care of the patient.    Total time spent today is 30 minutes of critical care time which included chart review history taking physical examination formation of the plan counseling and coordination of care      Felton Ordonez MD, MD  Text Page  (7am - 6pm)    Interval History   Patient seen and evaluated this morning, feeling better, is, main complaint is chest pain.  No fever chills headache dizziness lightheadedness no shortness of breath.  Told me the only thing that he use was fentanyl, he is not on taking any oral opioids or using any long-acting opiates.  He told me his drug of choice is cocaine and meth but has not been using it since she has been out from the rehab.      No other significant event overnight    -Data reviewed today: I reviewed all new labs and imaging results over the last 24 hours. I personally reviewed no images or EKG's today.    Physical Exam   Temp: 98.2  F (36.8  C) Temp src: Temporal BP: 110/65 Pulse: (!) 49   Resp: 11 SpO2: 100 % O2 Device: None (Room air)    Vitals:    08/10/23 1810 08/11/23 0430   Weight: 65.8 kg (145 lb) 64.1 kg (141 lb 5 oz)     Vital Signs with Ranges  Temp:  [97.9  F (36.6  C)-98.3  F (36.8  C)] 98.2  F (36.8  C)  Pulse:  [40-86] 49  Resp:  [7-24] 11  BP: ()/(55-74) 110/65  SpO2:  [96 %-100 %] 100 %  I/O last 3 completed shifts:  In: 263 [P.O.:120; I.V.:143]  Out: -     Constitutional: awake, alert, cooperative, no apparent distress, and appears stated age  Eyes: Lids and lashes normal, pupils equal, round and reactive to light, extra ocular muscles intact, sclera clear, conjunctiva normal  Respiratory: No increased work of breathing, good air exchange, clear to auscultation bilaterally, no crackles or wheezing  Cardiovascular: Normal apical impulse, regular rate and rhythm, normal S1 and S2, no S3 or S4, and no murmur noted  GI: No scars, normal bowel sounds, soft, non-distended,  non-tender, no masses palpated, no hepatosplenomegally  Musculoskeletal: no lower extremity pitting edema present  Neurologic: No focal deficit    Medications    [Held by provider] naloxone (NARCAN) 2.5 mg in sodium chloride 0.9 % 250 mL infusion 0.6 mg/hr (08/11/23 0748)      acetaminophen  1,000 mg Oral TID    emtricitabine-tenofovir  1 tablet Oral Daily    lidocaine  2 patch Transdermal Q24H    sodium chloride (PF)  3 mL Intracatheter Q8H    sodium chloride (PF)  3 mL Intracatheter Q8H    topiramate  100 mg Oral QAM    [START ON 8/13/2023] topiramate  100 mg Oral QPM    topiramate  50 mg Oral QPM       Data   Recent Labs   Lab 08/11/23  1000 08/11/23  0527 08/11/23  0413 08/10/23  1816   WBC  --  11.1*  --  10.5   HGB  --  15.2  --  16.3   MCV  --  93  --  92   PLT  --  317  --  333   NA  --  140  --  141   POTASSIUM  --  4.2  --  3.5   CHLORIDE  --  110*  --  106   CO2  --  18*  --  20*   BUN  --  11.4  --  12.4   CR  --  1.08  --  1.24*   ANIONGAP  --  12  --  15   FREDA  --  8.8  --  9.4   GLC 91 98 92 148*   ALBUMIN  --  3.9  --  4.7   PROTTOTAL  --  6.3*  --  7.5   BILITOTAL  --  0.8  --  0.6   ALKPHOS  --  104  --  125   ALT  --  20  --  26   AST  --  23  --  25       Recent Results (from the past 24 hour(s))   XR Chest 2 Views    Narrative    EXAM: XR CHEST 2 VIEWS  LOCATION: [facility]  DATE: 8/10/2023    INDICATION: chest pain s p overdose and chest compressions  COMPARISON: None.      Impression    IMPRESSION: Heart is normal in size. Lungs are clear.

## 2023-08-11 NOTE — ED NOTES
Manish paged. Pt is more somnolent, HR dropping to low 40's and has more and longer apnea episodes requiring to constantly wake patient up.     Security searched patient. Necklace removed as per Dr. Sen recommendations and placed on pt's backpack. Belongings on plastic bag, labeled and removed from pt's room. Room 15's locker does not lock.

## 2023-08-11 NOTE — H&P
[facility]    History and Physical - Hospitalist Service       Date of Admission:  8/10/2023    Assessment & Plan      Magy Garrett is a 23 year old adult admitted on 8/10/2023. He presents to the emergency department after what is thought to be an unintentional overdose of fentanyl on the light rail, subsequent bystander versus EMS CPR.  Received several doses of Narcan with normalization of mentation, and observation admission was requested given elevated troponin.  While in the emergency department following this request, patient once again became somnolent, likely used or was exposed to fentanyl on his person in the emergency department, again required repeated doses of Narcan and now admitted to ICU on Narcan drip.    Anticipated primary respiratory arrest with cardiopulmonary resuscitation: Smoking fentanyl on the light rail when he overdosed, apparently received cardiopulmonary resuscitation and Narcan administration.  Opiate abuse with overdose, suspected unintentional: Initially was in ER for 4+ hours after Narcan dose without evidence of somnolence or overdose.  Following admission request and my evaluation, however, patient became somnolent again and required multiple doses of Narcan for reversal.  Met again and discussed with patient who tells me he licked his finger, which he thought might still have had fentanyl on it.  Note that patient had not yet had his belongings removed or a sitter in place while in ER, so may have used any residual narcotics on his person.  -Capnography during hospitalization  -Narcan drip with ICU monitoring given repeated Narcan doses needed  -Belongings locked up by security  -Bedside sitter while on Narcan    Troponin elevation: Almost certainly secondary to respiratory failure with subsequent attempt at CPR in the setting of fentanyl overdose.  Low-level troponin elevation from 19 to 68 in ER. Now back to 46.  -TTE  -Repeated troponin,  "decreasing as anticipated with correction of hypoxia  -Lidocaine patches, for chest pain    Epilepsy: Initial onset of seizures in  following overdose.  Has followed with neurology as an outpatient and has an upcoming appointment with [facility] [place] neurology.  -Continue prior to admission topiramate; note plan for up titration in 4 days to 100 twice daily, currently 100 mg in a.m., 50 mg in the evening  -Seizure precautions  -Holding prior to admission Lamictal given history of suicidal gestures and current passive suicidal ideation.  Neurology consulted for recommendations regarding possible alternate medication.  Note that patient has been on gabapentin in the past, but has stopped taking this as he continues to have a desire to \"get high.\"  -Psychiatry consulted for patient's passive suicidal ideation  -It appears patient is decisional currently.  No active suicidality and no homicidal ideation.  Certainly makes bad decisions in terms of drug use, but decisional.  I do not believe that patient is currently holdable, but awaiting crisis assessment from the emergency department.    Passive suicidal ideation: Reports that he has no active suicidal or homicidal ideation, but was hoping that he would not be resuscitated if he .  -Suicide precautions, bedside sitter while on Narcan drip  -Psychiatry consulted  -Holding prior to admission Lamictal as he reports increased suicidal ideation since beginning this medication    Tobacco use disorder in recent remission: Quit smoking cigarettes 3 weeks ago.  Previously had been smoking since age 12 from 1 to 4 packs/day.  -Continue to encourage tobacco cessation/abstinence    Gender dysphoria in an adult: Female to male transgender  -Can resume prior to admission testosterone at discharge  -Continue Truvada PrEP         Diet:  Regular adult diet  DVT Prophylaxis: Ambulate every shift  Tierney Catheter: Not present  Lines: None     Cardiac Monitoring: None  Code Status:  Full " "code.  Patient with passive suicidal ideation and will remain full code unless otherwise deemed appropriate to change CODE STATUS by psychiatry service    Clinically Significant Risk Factors Present on Admission                       # Overweight: Estimated body mass index is 25.69 kg/m  as calculated from the following:    Height as of this encounter: 1.6 m (5' 3\").    Weight as of this encounter: 65.8 kg (145 lb).            Disposition Plan      Expected Discharge Date: 08/11/2023                  Clarke Sen MD  Hospitalist Service  [facility]  Securely message with [employer] (more info)  Text page via [email or web address] Paging/Directory     ______________________________________________________________________    Chief Complaint   Bystander CPR following unintentional fentanyl overdose    History is obtained from the patient, chart review, discussion with Dr. Herman in the emergency department    History of Present Illness   Magy Garrett is a 23 year old adult who was brought to the emergency department by EMS after a fentanyl overdose on public transportation.    Patient tells me that he has been using fentanyl for the past 3 days.  More longstanding use appears to be described by sober  by crisis assessment note.  He tells me that he smokes fentanyl, likes the way it makes him feel.  He describes a desire to get high.  Tells me that he had been smoking fentanyl earlier in the day, and when he met someone on the light rail and they began smoking together, he increased how much he was smoking.  He reports that his use was not an intentional overdose, though he also tells me that he made a joke about overdosing to other person on the light rail and showed them his Narcan in his backpack.  He does not recall what happens next, but apparently he received CPR, either from bystander or EMS.  He was given Narcan by EMS, and potentially by bystander as well.  It is unknown how long " he received CPR or if CPR was EMS or bystander administered as this information was not known by emergency department provider, and EMS run sheet is not available to me.  Patient of course is unaware of this information.    Regardless, patient had return of circulation and was brought to the emergency department for evaluation.  He did not receive any further doses of Narcan in the emergency department for 4+ hours and was mentating normally with no respiratory depression.  His troponins were trended and noted to be elevated, so observation admission was requested.  Discussed that low-level troponin rise was likely related to respiratory failure and systemic hypoxia, possibly, though less likely, associated with mild mechanical injury from CPR, though details of CPR unknown.  Observation admission for echocardiogram and troponin trend still requested.  Patient does have chest wall pain associated with recent compressions    Met with patient.  Noted history of suicidal gestures and passive suicidal ideation.  Noted also recent initiation on Lamictal for seizures.  Patient reports that his suicidal feelings had increased since Lamictal initiation.  Note also he has had several recent seizures and tells me he has follow-up with [facility] [place] neurology tomorrow.  Took his Lamictal tonight, though will place additional orders for this on hold while awaiting neurology recommendations regarding alternate antiepileptics given association with increased suicidal feelings in young patients on Lamictal.    While in the emergency department, patient again became somnolent, 6 or more hours after his last Narcan dose.  Responded to Narcan, but required multiple doses over 2 hours or so.  Transition to Narcan drip and will require ICU placement.  Met with patient on Narcan drip.  He reports that he licked his fingers, and believes there may have still been fentanyl on his hand, denies using otherwise while in the ER.  Suspect he may have  "ingested residual opiates on his person prior to his belongings being taken away, especially as his need for Narcan has been more prolonged.    Note patient is no longer on gabapentin.  When I ask why, he vaguely answers that he did not like it.  Question if he was not taking this medication because it was used for his craving.  When I asked him why he went back to using, he tells me that he \"likes to get high\" and will \"smoke anything in tinfoil.\"    Past Medical History    Tobacco use disorder  Gender dysphoria  Methamphetamine and cocaine use disorder in remission  Narcotic use disorder; I see mention of suspected narcotic dependence with withdrawal concern through sober     Past Surgical History   No past surgical history on file.    Prior to Admission Medications   Prior to Admission Medications   Prescriptions Last Dose Informant Patient Reported? Taking?   emtricitabine-tenofovir (TRUVADA) 200-300 MG per tablet   Yes No   Sig: Take 1 tablet by mouth daily at 2 pm   gabapentin (NEURONTIN) 600 MG tablet   Yes No   Sig: Take 600 mg by mouth 2 times daily   hydrOXYzine (ATARAX) 25 MG tablet   Yes No   Sig: Take 25 mg by mouth 3 times daily as needed for itching   lamoTRIgine (LAMICTAL) 25 MG tablet   Yes No   Sig: Take 25 mg by mouth      Facility-Administered Medications: None           Physical Exam   Vital Signs: Temp: 98.3  F (36.8  C) Temp src: Temporal BP: 123/62 Pulse: 73   Resp: 17 SpO2: 97 % O2 Device: None (Room air)    Weight: 145 lbs 0 oz    General Appearance: Well-appearing 23-year-old female to male transgender patient in no acute distress.  Comfortable  Eyes: No scleral icterus or injection  HEENT: Normocephalic and atraumatic  Respiratory: Occasional rattling cough consistent with bronchitis.  With deep inspiration we will have this rattling cough again.  No wheezing, no crackles.  Cardiovascular: Regular rate and rhythm  GI: Abdomen soft, nontender to palpation.  No palpable " "mass.  Lymph/Hematologic: No lower extremity edema  Skin: Multiple tattoos, several \"homemade.\"  No jaundice  Musculoskeletal: Muscular tone and bulk intact in all extremities and appropriate for age.  Neurologic: Alert, conversant, appropriate in conversation.  Mental status is grossly intact.  Later, on reassessment with respiratory depression, patient is more somnolent consistent with repeated opiate overdose.  Psychiatric: Very pleasant, normal affect    Medical Decision Making       75 MINUTES SPENT BY ME on the date of service doing chart review, history, exam, documentation & further activities per the note.   Multiple visits with patient in the emergency department including initial assessment as well as repeat assessments following increased somnolence and need for additional Narcan administration    Data     I have personally reviewed the following data over the past 24 hrs:    11.1 (H)  \   15.2   / 317     140 110 (H) 11.4 /  98   4.2 18 (L) 1.08 \     ALT: 20 AST: 23 AP: 104 TBILI: 0.8   ALB: 3.9 TOT PROTEIN: 6.3 (L) LIPASE: N/A     Trop: 46 (H) BNP: N/A       Imaging results reviewed over the past 24 hrs:   Recent Results (from the past 24 hour(s))   XR Chest 2 Views    Narrative    EXAM: XR CHEST 2 VIEWS  LOCATION: [facility]  DATE: 8/10/2023    INDICATION: chest pain s p overdose and chest compressions  COMPARISON: None.      Impression    IMPRESSION: Heart is normal in size. Lungs are clear.     "

## 2023-08-11 NOTE — PLAN OF CARE
Pt to be transferred to 6th floor.   All belongings sent with pt including backpack and phone.  Report given to receiving unit.  Pt is alert and oriented  POC discussed with pt.  Pt does not want anyone updated.

## 2023-08-11 NOTE — PROGRESS NOTES
Paged MD: Patients O2 is great off Naloxone for 2.5 hours. Can I remove capno? Please advise. Thank you    Per MD - patient can go off capno and transfer to a telemetry floor

## 2023-08-11 NOTE — PROVIDER NOTIFICATION
Brief note, history and physical dictation pending:    Patient is a 23-year-old female to male transgender patient who presented to the emergency department after he apparently overdosed on fentanyl while riding a light rail.    History of methamphetamine and cocaine use, tells me he has been sober from this and now uses fentanyl.  He describes a 3-day history of fentanyl use, but per DEC assessment and sober house, has been using for months and has been thought to be intoxicated at his treatment meetings.    An EMS run sheet is not currently available, but after his overdose, patient apparently received multiple doses of Narcan and CPR.  It is unclear if bystander CPR was initiated or if CPR was performed by EMS.  Patient had spontaneous return of circulation for what was likely a respiratory arrest.  My signout included no details as to how many rounds of CPR or who performed CPR.  Regardless, patient alert and conversant at time of admission request, four or more hours after last Narcan dose.    Troponin was noted to be mildly elevated and increasing in the setting of recent hypoxic respiratory failure with likely respiratory arrest leading to bystander versus EMS CPR.  Admission request for evaluation of elevated troponin requested.    Very low suspicion for acute coronary syndrome.  Anticipate minimally elevated troponin is secondary to respiratory arrest or possible troponin elevation related to CPR administration.  Initial plan to admit to observation for echocardiogram    Patient appears decisional at this time and is not currently on a hold.  Is voluntary.  He has passive suicidal ideation.  He tells me he was disappointed when he woke up to EMS shouting at him that he had  and he was brought back.  History of suicidal ideation.  Somewhat recently on Lamictal, which might be contributing.  He does tell me that he has had increase in these thoughts since initiating Lamictal for seizure  disorder    Neurology consulted for further recommendations regarding antiepileptic therapies with his suicidal ideation  Psychiatry consulted for his passive suicidal ideation.  He has no homicidal ideation and no active suicidal ideation.  Tells me that he only wants to get high.  As such, I do not believe that patient is holdable.  Fortunately currently voluntary.    Per crisis assessment, sober  apparently was requesting that patient be held until Monday so they can get him into [facility] or [facility].    During ER stay, patient became increasingly sleepy again despite 6 or more hours since last Narcan.  Note that he did have his backpack with him when I interviewed him in the emergency department, and bedside sitter had not yet been initiated.  Responded to Narcan, but required repeated doses to the point where he was then placed on a Narcan drip.  Reinterviewed patient, and he tells me that he licked his fingers, and believes there might have been some fentanyl still on his finger.    In ICU on Narcan drip  Not currently on a hold  Suicide precautions with bedside sitter until able to wean from Narcan    Clarke Sne MD.  7:00 AM

## 2023-08-11 NOTE — ED NOTES
Patient resting in bed.  Awakes to voice but falls right back to sleep.  Bradycardic in the low 40s.

## 2023-08-11 NOTE — ED NOTES
"[facility]  ED Nurse Handoff Report    ED Chief complaint: Drug Overdose and Suicidal      ED Diagnosis:   Final diagnoses:   Opiate overdose, undetermined intent, initial encounter (H)   Contusion of heart, initial encounter   Elevated troponin   Suicidal ideation       Code Status: see admitting MD    Allergies:   Allergies   Allergen Reactions    Lactose Unknown    Latex      Nitrile gloves     Pork Allergy Unknown     [employer]    Gluten Meal        Patient Story:   Pt presents via EMS for drug overdose. Pt has been riding bus and started to use fentanyl due to this. Pt reports he took a larger than normal dose, found down by bystanders. Pt given total of 12 mg narcan. Pt also had several min of CPR while wait for fire.      Focused Assessment:  patient alert and orientated x4    Treatments and/or interventions provided: labs, chest xray, see MAR for meds  Patient's response to treatments and/or interventions: good    To be done/followed up on inpatient unit:  see orders    Does this patient have any cognitive concerns?:  none    Activity level - Baseline/Home:  Independent  Activity Level - Current:   Independent    Patient's Preferred language: English   Needed?: No    Isolation: None  Infection: Not Applicable  Patient tested for COVID 19 prior to admission: NO  Bariatric?: No    Vital Signs:   Vitals:    08/10/23 1806 08/10/23 1808 08/10/23 1810 08/10/23 1954   BP: 123/62      Pulse:  86  73   Resp: 18   17   Temp: 98  F (36.7  C) 98.3  F (36.8  C)     TempSrc: Temporal Temporal     SpO2:  98%  97%   Weight:   65.8 kg (145 lb)    Height:   1.6 m (5' 3\")        Cardiac Rhythm:     Was the PSS-3 completed:   yes -yes  What interventions are required if any?          High Risk Required Interventions: On continuous in person observation    Family Comments: none  OBS brochure/video discussed/provided to patient/family: No              Name of person given brochure if not patient: na    "           Relationship to patient: na    For the majority of the shift this patient's behavior was Green.   Behavioral interventions performed were none.    ED NURSE PHONE NUMBER: [phone]
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
## 2023-08-12 NOTE — PLAN OF CARE
Goal Outcome Evaluation:  Summary:  unintentional Fentanyl overdose-was on Narcan drip in ICU.   DATE & TIME: 8/12/23 0652-8477  Cognitive Concerns/ Orientation : A&O x4, calm & cooperative   BEHAVIOR & AGGRESSION TOOL COLOR: Green  ABNL VS/O2: VSS on RA ex lyndsay ( mid 50s)  MOBILITY: IND  PAIN MANAGMENT: C/o chest pain d/t CPR (MD aware), tyelnol and lido patches providing some relief  DIET: Regular, fair appetite-  did have one bout of N/V- zofran effective  BOWEL/BLADDER: Continent of B/B  ABNL LAB/BG: WDL  DRAIN/DEVICES: L PIV SL  TELEMETRY RHYTHM: SB  SKIN: intact  TESTS/PROCEDURES: none  D/C DAY/GOALS/PLACE: TBD, pending safe dispo. Per notes and pt, they cannot return to [facility] sober living housing but  are able to place him at either [facility] [place] or [place] on Monday  OTHER IMPORTANT INFO: Seizure precautions.

## 2023-08-12 NOTE — PROGRESS NOTES
"Brief House NP Note  RRT called for nurse having concern for \"symptomatic bradycardia\". Upon arrival patient is non-toxic appearing, not in acute distress. He is alert and oriented x3. He endorses paresthesias in BLE, which he has at baseline, but reports they are worse than normal. Patient denies using any illicit drugs today. His mother came to visit, no other visitors. Patient denies fevers, chills, chest pain, palpitations, or SOB. Patient is afebrile. Skin is warm and dry. Lungs are clear. Abdomen is soft, slight tenderness.  Patient reports he has been nauseous and constipated today. RRT deescalated as patient has been bradycardic this admission. EKG reviewed and no acute changes.     Xray negative for bowel obstruction. No free air. Troponin 11. Mg 2.0.       Jj Tran NP  [facility]  Securely message with the Vocera Web Console (learn more here)  Text page via [employer] Paging/Directory    No charge    "

## 2023-08-12 NOTE — PROGRESS NOTES
[facility]    Neurology Progress Note     Assessment & Plan   Magy Garrett is a 23 year old adult who was admitted on 8/10/2023.  The patient admitted with overdose on fentanyl, history of seizures.  Lamictal was discontinued due to risk of suicide.    -We will do EEG monitoring for an hour to look for epileptiform activity.  -We will continue with Topamax for now.  -The question about restarting of Lamictal should be addressed by psychiatry.  If they think this is not a contraindication we could probably put the patient back on Lamictal.  -The patient will follow-up with his neurologist.  As an outpatient.    Yamila Martinez MD    Interval History   No further seizures reported.  The patient is calm was laying down in bed with the EEG on.  A portion of a few minutes that I looked at the EEG it is normal.  Final report is pending.    Physical Exam   Temp: 98.1  F (36.7  C) Temp src: Oral BP: 115/74 Pulse: (!) 44   Resp: 10 SpO2: 97 % O2 Device: None (Room air)    Vitals:    08/10/23 1810 08/11/23 0430   Weight: 65.8 kg (145 lb) 64.1 kg (141 lb 5 oz)     Vital Signs with Ranges  Temp:  [97.4  F (36.3  C)-98.1  F (36.7  C)] 98.1  F (36.7  C)  Pulse:  [41-56] 44  Resp:  [10-18] 10  BP: (108-127)/(64-74) 115/74  SpO2:  [96 %-98 %] 97 %  I/O last 3 completed shifts:  In: 480 [P.O.:480]  Out: 350 [Urine:350]      Medications      acetaminophen  1,000 mg Oral TID    emtricitabine-tenofovir  1 tablet Oral Daily    lidocaine  2 patch Transdermal Q24H    sodium chloride (PF)  3 mL Intracatheter Q8H    sodium phosphate  9 mmol Intravenous Once    topiramate  100 mg Oral BID       Data   Results for orders placed or performed during the hospital encounter of 08/10/23 (from the past 24 hour(s))   CBC with Platelets & Differential    Narrative    The following orders were created for panel order CBC with Platelets & Differential.  Procedure                               Abnormality          "Status                     ---------                               -----------         ------                     CBC with platelets and d...[[id number]]  Abnormal            Final result                 Please view results for these tests on the individual orders.   Renal panel   Result Value Ref Range    Sodium 141 136 - 145 mmol/L    Potassium 4.0 3.4 - 5.3 mmol/L    Chloride 109 (H) 98 - 107 mmol/L    Carbon Dioxide (CO2) 21 (L) 22 - 29 mmol/L    Anion Gap 11 7 - 15 mmol/L    Glucose 83 70 - 99 mg/dL    Urea Nitrogen 10.5 6.0 - 20.0 mg/dL    Creatinine 1.08 0.51 - 1.17 mg/dL    GFR Estimate 73 >60 mL/min/1.73m2    Calcium 9.3 8.6 - 10.0 mg/dL    Albumin 4.4 3.5 - 5.2 g/dL    Phosphorus 2.1 (L) 2.5 - 4.5 mg/dL    Narrative    The generation of reference intervals for this test is currently based on binary male or female sex. If the electronic health record information indicates another gender identity or if Legal Sex is recorded as \"Unknown\", both male and female reference intervals are provided where applicable, and should be considered according to the individual's appropriate clinical context.   CBC with platelets and differential   Result Value Ref Range    WBC Count 8.3 4.0 - 11.0 10e3/uL    RBC Count 5.31 3.80 - 5.90 10e6/uL    Hemoglobin 16.2 11.7 - 17.7 g/dL    Hematocrit 48.6 35.0 - 53.0 %    MCV 92 78 - 100 fL    MCH 30.5 26.5 - 33.0 pg    MCHC 33.3 31.5 - 36.5 g/dL    RDW 15.4 (H) 10.0 - 15.0 %    Platelet Count 304 150 - 450 10e3/uL    % Neutrophils 70 %    % Lymphocytes 21 %    % Monocytes 6 %    % Eosinophils 2 %    % Basophils 1 %    % Immature Granulocytes 0 %    NRBCs per 100 WBC 0 <1 /100    Absolute Neutrophils 5.8 1.6 - 8.3 10e3/uL    Absolute Lymphocytes 1.8 0.8 - 5.3 10e3/uL    Absolute Monocytes 0.5 0.0 - 1.3 10e3/uL    Absolute Eosinophils 0.2 0.0 - 0.7 10e3/uL    Absolute Basophils 0.0 0.0 - 0.2 10e3/uL    Absolute Immature Granulocytes 0.0 <=0.4 10e3/uL    Absolute NRBCs 0.0 10e3/uL    " "Narrative    The generation of reference intervals for this test is currently based on binary male or female sex. If the electronic health record information indicates another gender identity or if Legal Sex is recorded as \"Unknown\", both male and female reference intervals are provided where applicable, and should be considered according to the individual's appropriate clinical context.   EKG 12-lead, tracing only   Result Value Ref Range    Systolic Blood Pressure  mmHg    Diastolic Blood Pressure  mmHg    Ventricular Rate 47 BPM    Atrial Rate 47 BPM    VA Interval 154 ms    QRS Duration 102 ms     ms    QTc 364 ms    P Axis 56 degrees    R AXIS 69 degrees    T Axis 49 degrees    Interpretation ECG       Sinus bradycardia with sinus arrhythmia  Nonspecific ST abnormality  Abnormal ECG  When compared with ECG of 11-AUG-2023 01:05,  No significant change was found     Glucose by meter   Result Value Ref Range    GLUCOSE BY METER POCT 114 (H) 70 - 99 mg/dL   Troponin T, High Sensitivity   Result Value Ref Range    Troponin T, High Sensitivity 11 <=22 ng/L   Magnesium   Result Value Ref Range    Magnesium 2.0 1.7 - 2.3 mg/dL   XR Abdomen 1 View    Narrative    EXAM: XR ABDOMEN 1 VIEW  LOCATION: [facility]  DATE: 8/12/2023    INDICATION: abdominal discomfort  COMPARISON: None.      Impression    IMPRESSION: Moderate amount of stool throughout the entire colon. Bowel loops otherwise normal. No obstruction. No free air.     "

## 2023-08-12 NOTE — PROVIDER NOTIFICATION
"Paged Dr. Flores the following: \"Phos 2.1 this morning. Did you want replacements ordered?\"     MD ordered replacements    "

## 2023-08-12 NOTE — PLAN OF CARE
Goal Outcome Evaluation:      Plan of Care Reviewed With: patient    Overall Patient Progress: no change    DATE & TIME: 8/11/23 0992-8053  Cognitive Concerns/ Orientation : A&O x4, calm & cooperative   BEHAVIOR & AGGRESSION TOOL COLOR: Green  ABNL VS/O2: VSS on RA ex lyndsay (low 40s to mid 50s)  MOBILITY: SBA  PAIN MANAGMENT: C/o chest pain d/t CPR (MD aware), declined interventions  DIET: Regular, good appetite  BOWEL/BLADDER: Continent of B/B  ABNL LAB/BG: WBC 11.1  DRAIN/DEVICES: L PIV SL  TELEMETRY RHYTHM: SB  SKIN: intact  TESTS/PROCEDURES: none  D/C DAY/GOALS/PLACE: TBD, pending safe dispo. Per notes, pt cannot return to [facility] sober living housing but they are able to place him at either [facility] or [place] on Monday  OTHER IMPORTANT INFO: Seizure precautions.

## 2023-08-12 NOTE — PLAN OF CARE
Summary:  Transferred from ICU for Fentanyl overdose-was on Narcan drip  DATE & TIME: 8/11/23 8991-9808    Cognitive Concerns/ Orientation : Aox4, calm and cooperative   BEHAVIOR & AGGRESSION TOOL COLOR: Green  ABNL VS/O2: VSS on RA ex lyndsay (low 40s to mid 50s)  MOBILITY: SBA  PAIN MANAGMENT: C/o chest pain d/t CPR (MD aware), given scheduled Tylenol x1  DIET: Regular, good appetite  BOWEL/BLADDER: Continent of B/B  ABNL LAB/BG: Cl 110, WBC 11.1, urine positive for cannabinoids  DRAIN/DEVICES: L PIV SL  TELEMETRY RHYTHM: SB  SKIN: intact  TESTS/PROCEDURES: none  D/C DAY/GOALS/PLACE: TBD  OTHER IMPORTANT INFO: psych,  mental health and neurology consulted, on seizure precautions, suicide percautions d/c early afternoon. Nausea and small emesis, given PRN PO zofran x1 - found relief

## 2023-08-13 NOTE — PROGRESS NOTES
OBSERVATION GOALS    -diagnostic tests and consults completed and resulted - not met  -vital signs normal or at patient baseline - met  -tolerating oral intake to maintain hydration - met  -returns to baseline functional status - met  -safe disposition plan has been identified - not met.

## 2023-08-13 NOTE — PLAN OF CARE
DATE & TIME: 8/12/23 5568-2998  Cognitive Concerns/ Orientation : A&O x4   BEHAVIOR & AGGRESSION TOOL COLOR: Green  ABNL VS/O2: VSS on RA ex lyndsay (40s-50s)  MOBILITY: Independent  PAIN MANAGMENT: Chest pain from CPR. On scheduled Tylenol and PRN ibuprofen x1. Lidocaine patches removed   DIET: Regular diet but intermittent nausea today. Stated he vomitted after breakfast and lunch today and Zofran was given earlier. Also constipated so PRN senna given. Abdominal Xray completed this shift neg for ileus and obstruction.   BOWEL/BLADDER: Continent of B/B  ABNL LAB/BG: Phos 2.1, replaced and recheck tomorrow morning   DRAIN/DEVICES: L PIV SL  TELEMETRY RHYTHM: SB with PAC's   SKIN: Intact   TESTS/PROCEDURES: 1 hr EEG completed this shift (waiting for neurologist to review)  D/C DAY/GOALS/PLACE: TBD, pending safe dispo. Per notes and pt, they cannot return to [facility] sober living housing but  are able to place him at either [facility] or [place] on Monday  OTHER IMPORTANT INFO: Seizure precautions. Psych and neurology following.
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
## 2023-08-13 NOTE — PROGRESS NOTES
[facility]    Medicine Progress Note - Hospitalist Service    Date of Admission:  8/10/2023    Assessment & Plan     This is a 23-year-old transgender male, history of meth and cocaine abuse, history of seizure disorder, bipolar disorder, PTSD, chemical dependency, recently in rehab and currently in outpatient treatment for his meth and cocaine abuse, started using fentanyl and was admitted with fentanyl overdose and neck respiratory failure.  He does have history of suicidal ideation suicide attempts in the past.           Assessment & Plan  Acute respiratory arrest secondary to fentanyl overdose unintentional   Fentanyl overdose on fentanyl abuse  History of meth and cocaine abuse  Suicidal ideation  Musculoskeletal chest pain   Smoking fentanyl on the light rail when he overdosed, apparently received cardiopulmonary resuscitation and Narcan administration.    He received multiple doses of Narcan, does get improved, and then somnolent again.  Subsequently was started on Narcan infusion and admitted to the ICU.  Patient told me that his drug of choice is meth and cocaine but has been sober and have inpatient treatment.  She started using fentanyl for the last 3 days only.  He was found intoxicated during one of the meeting for his chemical dependency.  Pt was in ICU intially then transferred to      He is alert awake and not requiring any oxygen  He denies any suicidal ideation  suicidal precautions discontinued on 8/11/23.     Psychiatry is consulted and appreciate their input.  As per Psychiatry Patient wants to go back to sober living arrangement.   He does not want to go to inpatient psychiatry and does not meet criteria. He is also not holdable or committable at this point. Explained to the patient that he will remain at risk as long as he continues to use, and encouraged him to continue with pursuing recovery.      Consult for chemical dependency is placed     Troponin  elevation:  Very mildly elevated troponin initial 19, was 68 and then 46.  This is likely secondary to CPR, and unlikely secondary to ACS.  He is musculoskeletal chest pain which is reproducible on exam at this time.  Echocardiogram checked results were normal  Continue lidocaine patches for chest pain and schedule Tylenol 1000 mg 3 times a day.  Avoid narcotics     Epilepsy:   Initial onset of seizures in  following overdose.    Has followed with neurology as an outpatient and has an upcoming appointment with [facility] neurology.  Patient is on Topamax as well as lamotrigine prior to admission.  History of seizures last Monday, mostly happen with been noncompliant with his medication.  Keeping the seizures precautions  Lamotrigine is put on hold by the admitting physician as concern for increased suicidal ideation since being on this medication.  Neurology is consulted and recommended continuing Topamax 100mg PO BID   1hour Video EEG ordered as per Neurology            Passive suicidal ideation: No active suicidal ideation at this point  Reports that he has no active suicidal or homicidal ideation, but was hoping that he would not be resuscitated if he .  -Suicide precautions discontinued as per Psychiatry      Tobacco use disorder in recent remission:   Quit smoking cigarettes 3 weeks ago.  Previously had been smoking since age 12 from 1 to 4 packs/day.  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    Weight as of this encounter: 64.1 kg (141 lb 5 oz).            Disposition Plan     Expected Discharge Date: 08/14/2023     once arrangements are made  Discharge Comments:   Awaiting for placement    Jonna Lucas MD  Hospitalist Service  [facility]  Securely message with [employer] (more info)  Text page via [employer] Paging/Directory   ______________________________________________________________________    Interval History   Feels ok except for some headache  Headache did respond twice pattern Tylenol  We will avoid narcotics    Physical Exam   Vital Signs: Temp: 98  F (36.7  C) Temp src: Oral BP: 123/71 Pulse: (!) 48   Resp: 16 SpO2: 98 % O2 Device: None (Room air)    Weight: 141 lbs 5.04 oz        GENERAL APPEARANCE: healthy, alert and no distress  EYES: Eyes grossly normal to inspection, PERRL and conjunctivae and sclerae normal  NECK: no adenopathy  RESP: lungs clear to auscultation - no rales, rhonchi or wheezes  CV: regular rates and rhythm, normal S1 S2, no S3          Medical Decision Making         Data     I have personally reviewed the following data over the past 24 hrs:    Trop: 11 BNP: N/A       "

## 2023-08-13 NOTE — PLAN OF CARE
Goal Outcome Evaluation:     DATE & TIME: 8/13/23 5870-4583  Cognitive Concerns/ Orientation: A&O x4   BEHAVIOR & AGGRESSION TOOL COLOR: Green  ABNL VS/O2: Vitals stable on RA except  bradycardic but better this shift HR 56-48  MOBILITY: Independent  PAIN MANAGMENT: scheduled tylenol and lidocaine patch given. Denies N/V  DIET: Regular diet .   BOWEL/BLADDER: Continent of B&B, C/o constipation- Miralax and senna given and that effective reported passing his bowel the end of the shift.  ABNL LAB/BG: Phos 2.6,   DRAIN/DEVICES: L PIV SL  TELEMETRY RHYTHM: Sinus bradycardia  SKIN: Intact   TESTS/PROCEDURES: None.  D/C DAY/GOALS/PLACE: TBD, pending safe dispo. Per notes and pt, they cannot return to PTA sober living housing but  are able to place him at either [facility] or [place] on Monday  OTHER IMPORTANT INFO: Seizure precautions. Psych and neurology following.         OBSERVATION GOALS     -diagnostic tests and consults completed and resulted - Met  -vital signs normal or at patient baseline - Not met- bradicardic  -tolerating oral intake to maintain hydration - met  -returns to baseline functional status - met  -safe disposition plan has been identified - not met.

## 2023-08-13 NOTE — PROGRESS NOTES
DATE & TIME: 8/12/23 night.  Cognitive Concerns/ Orientation : A&O x4   BEHAVIOR & AGGRESSION TOOL COLOR: Green  ABNL VS/O2: Vitals stable on RA except  lyndsay (upper 30s to mid 40s)  MOBILITY: Independent  PAIN MANAGMENT:  Denies pain this shift.  DIET: Regular diet . No c/o n/v .  BOWEL/BLADDER: Continent of B&B  ABNL LAB/BG: Phos 2.1, recheck  8/13/23 morning.  DRAIN/DEVICES: L PIV SL  TELEMETRY RHYTHM: Sinus bradycardia  SKIN: Intact   TESTS/PROCEDURES: 1 hr EEG completed on 8/12/23.  D/C DAY/GOALS/PLACE: TBD, pending safe dispo. Per notes and pt, they cannot return to PTA sober living housing but  are able to place him at either [facility] or [place] on Monday  OTHER IMPORTANT INFO: Seizure precautions. Psych and neurology following.        OBSERVATION GOALS     -diagnostic tests and consults completed and resulted - not met  -vital signs normal or at patient baseline - met  -tolerating oral intake to maintain hydration - met  -returns to baseline functional status - met  -safe disposition plan has been identified - not met.

## 2023-08-14 NOTE — PROGRESS NOTES
[facility]    Medicine Progress Note - Hospitalist Service    Date of Admission:  8/10/2023    Assessment & Plan   Magy Garrett is a 23-year-old transgender male, history of meth and cocaine abuse, history of seizure disorder, bipolar disorder, PTSD, chemical dependency, recently in rehab and currently in outpatient treatment for his meth and cocaine abuse, started using fentanyl and was admitted with fentanyl overdose and neck respiratory failure.  He does have history of suicidal ideation suicide attempts in the past.     Acute respiratory arrest secondary to fentanyl overdose unintentional   Fentanyl overdose  Fentanyl abuse  History of meth and cocaine abuse  Suicidal ideation  Smoking fentanyl on the light rail when he overdosed, apparently received cardiopulmonary resuscitation and Narcan administration.  Mental status improved, but then recurrent somnolence and required multiple further doses of Narcan. Patient told prior hospitalist that his drugs of choice are meth and cocaine but has been sober and had inpatient treatment.  She started using fentanyl for the last 3 days only.  He was found intoxicated during one of the meeting for his chemical dependency.  -Admitted to ICU on narcan infusion.  -Able to be weaned off narcan infusion the day after admission.  -Transferred out of ICU on 8/11/23.  -He is alert awake and not requiring any oxygen.  -Chemical dependency consulted, appreciate their assistance.  -Psychiatry consulted, appreciate their assistance.  -He wants to go back to Sober Living. He does not want to go to inpatient psychiatry and does not meet their criteria for inpatient admission. He is also not holdable or committable at this point. Has been explained to the patient that he will remain at risk as long as he continues to use, and he has been encouraged to continue with pursuing recovery.   -Social work following, appreciate their assistance. Further discharge  planning pending chemical dependency recommendations.     Musculoskeletal chest pain  Troponin elevation  Very mildly elevated troponin initial 19, was 68 and then 46. This is likely secondary to CPR, and unlikely secondary to ACS. He has musculoskeletal chest pain which is reproducible on exam.  -Echocardiogram checked - results were normal.  -Continue lidocaine patches for chest pain and schedule Tylenol 1000 mg 3 times a day.  -Avoid narcotics.     Epilepsy  Initial onset of seizures in  following overdose.  Has followed with neurology as an outpatient and has an upcoming appointment with [facility] neurology.  Patient was on Topamax and lamotrigine prior to admission.  History of seizures the Monday prior to admission, mostly happen when he has been noncompliant with his medication.  -Seizure precautions.  -Continue PTA Topamax.  -PTA lamotrigine discontinued due to concern that it contributed to increased suicidal ideation.  -Video EEG on 23 was normal.     Passive suicidal ideation  Reports that he has no active suicidal or homicidal ideation, but was hoping that he would not be resuscitated if he .  -Psychiatry consulted, appreciate their assistance.  -Suicide precautions discontinued as per Psychiatry.     Tobacco use disorder in recent remission  Quit smoking cigarettes 3 weeks ago.  Previously had been smoking since age 12 from 1 to 4 packs/day.  -Continue to encourage tobacco cessation/abstinence.     Gender dysphoria in an adult: Female to male transgender  -Can resume prior to admission testosterone at discharge.  -Continue Truvada.        Diet: Regular Diet Adult    DVT Prophylaxis: Low Risk/Ambulatory with no VTE prophylaxis indicated  Tierney Catheter: Not present  Lines: None     Cardiac Monitoring: None  Code Status: Full Code      Clinically Significant Risk Factors Present on Admission                       # Overweight: Estimated body mass index is 25.03 kg/m  as calculated from the  "following:    Height as of this encounter: 1.6 m (5' 3\").    Weight as of this encounter: 64.1 kg (141 lb 5 oz).              Disposition Plan      Expected Discharge Date: 08/15/2023      Destination: [facility]  Discharge Comments: needs cd eval, inpt treatment          Chris Hwang MD  Hospitalist Service  [facility]  Securely message with [employer] (more info)  Text page via [employer] Paging/Directory   ______________________________________________________________________    Interval History   Magy PANTOJA Jeancarlosalejandro was seen this afternoon. No new complaints/concerns. Discussed with nursing and social work.    Physical Exam   Vital Signs: Temp: 98.2  F (36.8  C) Temp src: Oral BP: 125/84 Pulse: 52   Resp: 16 SpO2: 96 % O2 Device: None (Room air)    Weight: 141 lbs 5.04 oz    Constitutional: awake, alert, cooperative, no apparent distress, laying in the hospital bed    Medical Decision Making       25 MINUTES SPENT BY ME on the date of service doing chart review, history, exam, documentation & further activities per the note.      Data   NOTE: Data reviewed over the past 24 hrs contributes toward MDM complexity  "

## 2023-08-14 NOTE — PLAN OF CARE
Summary: Unintentional Fentanyl overdose-was on Narcan drip in ICU  DATE & TIME: 8/14/23 7-3 pm   Cognitive Concerns/ Orientation: A & O x 4   BEHAVIOR & AGGRESSION TOOL COLOR: Green  ABNL VS/O2: VSS on RA except bradycardic at rest at times. Mild Tachycardia with ambulation.   MOBILITY: Independent, walker by bedside.  PAIN MANAGMENT: Mild pain to sternum from CPR, Lidocaine patch in place and on scheduled Tylenol.   DIET: Regular.  BOWEL/BLADDER: Continent of B&B. Constipation from yesterday resolved per pt report.  ABNL LAB/BG: Phosphorus 2.0, started oral replacement.   DRAIN/DEVICES: PIV SL  TELEMETRY RHYTHM: N/A  SKIN: Intact. Rash to R upper arm and chest.   TESTS/PROCEDURES: None.  D/C DAY/GOALS/PLACE: TBD, pending safe dispo. Per notes and pt, cannot return to P[facility] sober living housing but is willing to go to inpatient treatment.   OTHER IMPORTANT INFO: Seizure precautions. Chem dep consult still needed. Stable. No changes.

## 2023-08-14 NOTE — PLAN OF CARE
Summary:  unintentional Fentanyl overdose-was on Narcan drip in ICU  DATE & TIME: 8/13/23 [id number]  Cognitive Concerns/ Orientation: A&O x4   BEHAVIOR & AGGRESSION TOOL COLOR: Green  ABNL VS/O2: Vitals stable on RA except  bradycardic (mid 40s to mid 50s)  MOBILITY: Independent, felt some weakness-given walker  PAIN MANAGMENT: scheduled tylenol given x2. Denies N/V  DIET: Regular diet .   BOWEL/BLADDER: Continent of B&B  ABNL LAB/BG: Phos 2.6,   DRAIN/DEVICES: L PIV SL  TELEMETRY RHYTHM: Sinus bradycardia  SKIN: Intact   TESTS/PROCEDURES: None.  D/C DAY/GOALS/PLACE: TBD, pending safe dispo. Per notes and pt, they cannot return to PTA sober living housing but  are able to place him at either [facility] or [place] on Monday  OTHER IMPORTANT INFO: Seizure precautions. Psych and neurology following. Chem dep consult placed

## 2023-08-14 NOTE — PROGRESS NOTES
Summary:  unintentional Fentanyl overdose-was on Narcan drip in ICU  DATE & TIME: 8/13/23 night.  Cognitive Concerns/ Orientation: A&O x4   BEHAVIOR & AGGRESSION TOOL COLOR: Green  ABNL VS/O2: Vitals stable on RA except  bradycardic (HR 47)  MOBILITY: Independent, walker by bedside.  PAIN MANAGMENT: Denies pain, Denies N/V  DIET: Regular.  BOWEL/BLADDER: Continent of B&B  ABNL LAB/BG: No ABNL lab values.  DRAIN/DEVICES: L PIV SL  TELEMETRY RHYTHM: Discontinued MD aware of bradycardia.  SKIN: Intact .  TESTS/PROCEDURES: None.  D/C DAY/GOALS/PLACE: TBD, pending safe dispo. Per notes and pt, cannot return to PTA sober living housing but is able to go to inpatient treatment  at either [facility] or [place] on 8/14/23.  OTHER IMPORTANT INFO: Seizure precautions. Psych and neurology following. Chem dep consult placed.

## 2023-08-14 NOTE — CONSULTS
8/14/2023    Pt has been interviewed via telephone and CD Consult has been completed. Email has been sent to unit  with MIGUEL for pt to sign. Email has been sent to pt with facilities contact info.  Referral has been sent to [facility], per pt request. Referrals to other facilities can be sent if pt would like other treatment alternatives.     Recommendations:      Abstain from all non-prescribed mood-altering substances  Take all medications as prescribed  Enter and complete a residential or inpatient CD treatment program  Follow all recommendations upon discharge from treatment. Recommendations may include, but are not limited to: extended treatment, outpatient treatment and/or sober housing.        5.   Follow all recommendations of your medical providers     Clinical Substantiation:       Pt reports he attended CD treatment with [facility] this spring and was sober since May until his recent relapse. Pt reports he has a sponsor and has attended support meetings. Pt reports he would be interested in seeing a therapist. Pt reports he has been working. Pt reports he would be willing to attend inpatient CD treatment. Pt lacks coping skills and relapse prevention strategies.      Referrals/ Alternatives:  [facility]  Phone: [phone]  Fax: [facility] [phone]     [address] [place] [address]  Phone:   [phone]  Fax:   [phone]      [facility] Team for Referrals  Phone: [phone]  Main office: [phone]  Fax: [phone]  Fax: [phone]     Critical access hospital [phone]  Fax - [phone]     [facility] consult completed by: Yi Escalera [facility].  Phone Number: [phone]  E-mail Address: [email or web address]  [address] [place] [id number]
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
## 2023-08-14 NOTE — PROVIDER NOTIFICATION
".MD Notification    Notified Person: MD    Notified Person Name: Keesha Somers    Notification Date/Time: 8/13/23 @ 7378    Notification Interaction: Vocera Web    Purpose of Notification: \"Hello. pt c/o heartburn. Can we some PRN heartburn meds. thanks\"    Orders Received: PRN Tums 500 mg ordered    Comments:   "

## 2023-08-15 NOTE — PLAN OF CARE
Goal Outcome Evaluation:      Plan of Care Reviewed With: patient    Summary: Unintentional Fentanyl overdose-was on Narcan drip in ICU  DATE & TIME: 8/15/23 [id number]  Cognitive Concerns/ Orientation: A&Ox4   BEHAVIOR & AGGRESSION TOOL COLOR: Green; overall seemed in a good mood but per MD statement got tearful after getting of phone with mother  ABNL VS/O2: VSS on RA  MOBILITY: Independent  PAIN MANAGMENT: Sternum following CPR says it gets up to a 7/10 at times, intermittent Lido patch and kar tylenol provided; Having some oral pain orders placed for Benzocaine- waiting for pharm to deliver  DIET: Regular.  BOWEL/BLADDER: Continent of B&B.   ABNL LAB/BG: Phosphorus 1.9 orally replaced, will have another oral replacement at 1600 and will need recheck ordered  DRAIN/DEVICES: PIV SL  TELEMETRY RHYTHM: N/A  SKIN: Intact. Rash to R upper arm most prominent to medial portion of bicep following use of latex tourniquet. Red rash area on old tele patch spots; was concerned lidocaine patches would cause reaction- verified with pharm their is no latex in patches  TESTS/PROCEDURES: None.  D/C DAY/GOALS/PLACE: TBD - chem dep consult completed -  SW has sent out referrals to [facility] inpatient treatment .   OTHER IMPORTANT INFO: Seizure precautions. Not holdable per MD if pt tries to leave.

## 2023-08-15 NOTE — PROGRESS NOTES
Care Management Follow Up    Length of Stay (days): 2    Expected Discharge Date: 08/15/2023     Concerns to be Addressed:       Patient plan of care discussed at interdisciplinary rounds: Yes    Anticipated Discharge Disposition: Inpatient Chemical Dependency     Anticipated Discharge Services:    Anticipated Discharge DME:      Patient/family educated on Medicare website which has current facility and service quality ratings:    Education Provided on the Discharge Plan:    Patient/Family in Agreement with the Plan:      Referrals Placed by CM/SW:    Private pay costs discussed: Not applicable    Additional Information:  Writer called [facility] regarding receiving patient's application. Facility had not received patient's application at this time. Writer followed up with [facility] for patient and they will resend the referral. Writer to obtain MIGUEL from patient.    Addendum: Writer spoke with patient and patient is set on going to [facility] for discharge. Writer informed patient that it would be about a week out for being able to be accepted into the treatment facility. Patient would be making plans to stay with his mother if needed to discharge before he is able to get into treatment.    Writer followed up with [facility] and they are asking the patient has another assessment sent with correct name and date of birth. Writer deferred to chemical dependency to speak with admissions from [facility] to get appropriate documentation needed for discharge to treatment facility. [facility] will be running background checks and insurance for patient to be able to discharge to facility.     Addendum 4:00PM: Patient reports he will not be able to stay with his mother. Patient may have to consider alternative treatment options that can accept him sooner.     SW to continue to follow for discharge.     Praneeth Marrufo [facility]  Care Management

## 2023-08-15 NOTE — PROGRESS NOTES
[facility]    Neurology Progress Note     Assessment & Plan   Magy Garrett is a 23 year old adult who was admitted on 8/10/2023.  The patient is doing well from the seizure point of view.  Admitted with overdose on fentanyl.  I tried to encourage him about being patient going to the inpatient treatment which I think would be the best for him.  The 2-hour video EEG monitoring that was done a couple of days ago was normal with no epileptiform discharges or seizures.    -We will continue with Topamax for now  -Psychiatry should make a commentary if lamotrigine could be considered if needed.  -The patient should follow-up with his neurologist as an outpatient.    Neurology will sign off if there are any questions or concerns please call us    Yamila Martinez MD    Interval History   The patient has been doing well hemodynamically.  No further seizures.  The patient accepted to go to inpatient treatment however he tells me he feels the urge to get high and he is quite upset because he has to wait for almost a week to go.  He is also tearful because he just talked to his mother requesting her to take him until inpatient treatment however the mother refused.    Physical Exam   Temp: 98.5  F (36.9  C) Temp src: Oral BP: 109/67 Pulse: 80   Resp: 17 SpO2: 99 % O2 Device: None (Room air)    Vitals:    08/10/23 1810 08/11/23 0430 08/15/23 0635   Weight: 65.8 kg (145 lb) 64.1 kg (141 lb 5 oz) 63 kg (138 lb 14.2 oz)     Vital Signs with Ranges  Temp:  [98  F (36.7  C)-98.5  F (36.9  C)] 98.5  F (36.9  C)  Pulse:  [52-80] 80  Resp:  [16-17] 17  BP: (109-122)/(51-78) 109/67  SpO2:  [95 %-99 %] 99 %  I/O last 3 completed shifts:  In: 300 [P.O.:300]  Out: -     The patient is alert oriented x3 no acute distress.  Speech is fluent there is no aphasia apraxia or agnosia.  Attention and memory are normal.  Pupils are equal round reactive to light extraocular movements are intact, there is no  nystagmus.  Facial muscles are equal bilaterally.  Uvula and tongue are midline.  Sternocleidomastoid and trapezius muscles are normal bilaterally.    Muscular mass tone and strength are normal in all 4 extremities there is no pronator drift.  Reflexes are present symmetric in upper and lower extremities.  Babinski is absent.    Sensory exam is normal to light touch and vibratory sense.    Finger-nose-finger heel-to-shin without dysmetria.  Fine movements are normal.    Gait has normal base.      Medications      acetaminophen  1,000 mg Oral TID    emtricitabine-tenofovir  1 tablet Oral Daily    lidocaine  2 patch Transdermal Q24H    potassium & sodium phosphates  2 packet Oral or Feeding Tube Q4H    sodium chloride (PF)  3 mL Intracatheter Q8H    topiramate  100 mg Oral BID       Data   Results for orders placed or performed during the hospital encounter of 08/10/23 (from the past 24 hour(s))   Phosphorus   Result Value Ref Range    Phosphorus 1.9 (L) 2.5 - 4.5 mg/dL

## 2023-08-15 NOTE — PROGRESS NOTES
[facility]    Medicine Progress Note - Hospitalist Service    Date of Admission:  8/10/2023    Assessment & Plan   Magy Garrett is a 23-year-old transgender male, history of meth and cocaine abuse, history of seizure disorder, bipolar disorder, PTSD, chemical dependency, recently in rehab and currently in outpatient treatment for his meth and cocaine abuse, started using fentanyl and was admitted with fentanyl overdose and neck respiratory failure.  He does have history of suicidal ideation suicide attempts in the past.     Acute respiratory arrest secondary to fentanyl overdose unintentional   Fentanyl overdose  Fentanyl abuse  History of meth and cocaine abuse  Suicidal ideation  Smoking fentanyl on the light rail when he overdosed, apparently received cardiopulmonary resuscitation and Narcan administration.  Mental status improved, but then recurrent somnolence and required multiple further doses of Narcan. Patient told prior hospitalist that his drugs of choice are meth and cocaine but has been sober and had inpatient treatment.  She started using fentanyl for the last 3 days only.  He was found intoxicated during one of the meeting for his chemical dependency.  -Admitted to ICU on narcan infusion.  -Able to be weaned off narcan infusion the day after admission.  -Transferred out of ICU on 8/11/23.  -He is alert awake and not requiring any oxygen.  -Chemical dependency consulted, appreciate their assistance.  -Psychiatry consulted, appreciate their assistance.  -[facility]. He does not want to go to inpatient psychiatry and does not meet their criteria for inpatient admission. He is also not holdable or committable at this point. Has been explained to the patient that he will remain at risk as long as he continues to use, and he has been encouraged to continue with pursuing recovery.   -Social work following, appreciate their assistance. Patient hopeful to  go to [facility], social work assisting with discharge planning.    Musculoskeletal chest pain  Troponin elevation  Very mildly elevated troponin initial 19, was 68 and then 46. This is likely secondary to CPR, and unlikely secondary to ACS. He has musculoskeletal chest pain which is reproducible on exam.  -Echocardiogram checked - results were normal.  -Continue lidocaine patches for chest pain and schedule Tylenol 1000 mg 3 times a day.  -Avoid narcotics.     Epilepsy  Initial onset of seizures in  following overdose.  Has followed with neurology as an outpatient and has an upcoming appointment with [facility] neurology.  Patient was on Topamax and lamotrigine prior to admission.  History of seizures the Monday prior to admission, mostly happen when he has been noncompliant with his medication.  -Seizure precautions.  -Continue PTA Topamax.  -PTA lamotrigine discontinued due to concern that it contributed to increased suicidal ideation.   -Discussed with psychiatry today. They will see patient tomorrow, comment on if lamotrigine could be considered in the future if needed.  -Video EEG on 23 was normal.     Passive suicidal ideation  Reports that he has no active suicidal or homicidal ideation, but was hoping that he would not be resuscitated if he .  -Psychiatry consulted, appreciate their assistance.  -Suicide precautions discontinued as per Psychiatry.     Tobacco use disorder in recent remission  Quit smoking cigarettes 3 weeks ago.  Previously had been smoking since age 12 from 1 to 4 packs/day.  -Continue to encourage tobacco cessation/abstinence.     Hypophosphatemia  -Replace and recheck per RN managed protocol.    Jaw pain  No significant abnormality noted on exam today.  -Symptomatic management with PRN acetaminophen and ibuprofen. Will also order prn Orajel.    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  -Continue Truvada.       Diet: Regular Diet Adult    DVT Prophylaxis: Low Risk/Ambulatory with no VTE prophylaxis indicated  Tierney Catheter: Not present  Lines: None     Cardiac Monitoring: None  Code Status: Full Code      Clinically Significant Risk Factors                                    Disposition Plan      Expected Discharge Date: 08/22/2023      Destination: [facility]  Discharge Comments: needs cd eval, inpt treatment          Chris Hwang MD  Hospitalist Service  [facility]  Securely message with [employer] (more info)  Text page via [employer] Paging/Directory   ______________________________________________________________________    Interval History   Gregory Garrett was seen this afternoon. He feels frustrated. Called his mom earlier, she was not willing to let him stay with her until a bed becomes available at [facility]. Denies fevers, chest pain, shortness of breath, nausea, abdominal pain. Some pain in his left upper jaw.    Physical Exam   Vital Signs: Temp: 98.5  F (36.9  C) Temp src: Oral BP: 109/67 Pulse: 80   Resp: 17 SpO2: 99 % O2 Device: None (Room air)    Weight: 138 lbs 14.24 oz    Constitutional: awake, alert, cooperative, no apparent distress, sitting up in the hospital bed  ENT: no obvious swelling or drainage noted from his left upper jaw  Respiratory: no increased work of breathing, clear to auscultation bilaterally, no crackles or wheezing  Cardiovascular: regular rate and rhythm, normal S1 and S2, no murmur noted  GI: normal bowel sounds, soft, non-distended, non-tender  Skin: warm, dry  Musculoskeletal: no lower extremity pitting edema present  Neurologic: awake, alert, answers questions appropriately, moves all extremities    Medical Decision Making       40 MINUTES SPENT BY ME on the date of service doing chart review, history, exam, documentation & further activities per the note.      Data   NOTE: Data reviewed over the past 24 hrs  contributes toward MDM complexity

## 2023-08-15 NOTE — PLAN OF CARE
Summary: Unintentional Fentanyl overdose-was on Narcan drip in ICU  DATE & TIME: 8/14/23 7344-8558  Cognitive Concerns/ Orientation: A & O x 4   BEHAVIOR & AGGRESSION TOOL COLOR: Green  ABNL VS/O2: VSS on RA except  bradycardic at rest  MOBILITY: Independent  PAIN MANAGMENT: Denied pain to sternum following CPR says it gets up to a 4/10 at times, intermittent Lido patch and kar tylenol available   DIET: Regular.  BOWEL/BLADDER: Continent of B&B.   ABNL LAB/BG: Phosphorus 2.0, being orally replaced (recheck in AM)  DRAIN/DEVICES: PIV SL  TELEMETRY RHYTHM: N/A  SKIN: Intact. Rash to R upper arm most prominent to medial portion of bicep following use of latex tourniquet. Red rash area on old tele patch spots  TESTS/PROCEDURES: None.  D/C DAY/GOALS/PLACE: TBD - chem dep consult completed -  SW has sent out referrals to [facility] inpatient treatment .   OTHER IMPORTANT INFO: Seizure precautions. Not holdable per MD if pt tries to leave.

## 2023-08-15 NOTE — PLAN OF CARE
Goal Outcome Evaluation:         Summary: Unintentional Fentanyl overdose-was on Narcan drip in ICU  DATE & TIME: 8/14/23 [id number]  Cognitive Concerns/ Orientation: A & O x 4   BEHAVIOR & AGGRESSION TOOL COLOR: Green  ABNL VS/O2: VSS on RA except  bradycardic at rest  MOBILITY: Independent  PAIN MANAGMENT: 4/10 pain to sternum following CPR, intermittent Lido patch - received scheduled and PRN Tylenol  DIET: Regular.  BOWEL/BLADDER: Continent of B&B.   ABNL LAB/BG: Phosphorus 2.0, being orally replaced (recheck in AM)  DRAIN/DEVICES: PIV SL  TELEMETRY RHYTHM: N/A  SKIN: Intact. Rash to R upper arm most prominent to medial portion of bicep following use of latex tourniquet.  TESTS/PROCEDURES: None.  D/C DAY/GOALS/PLACE: TBD - chem dep consult completed -  SW has sent out referrals to [facility] inpatient treatment .   OTHER IMPORTANT INFO: Seizure precautions.

## 2023-08-15 NOTE — PLAN OF CARE
Goal Outcome Evaluation:    Summary: Unintentional Fentanyl overdose-was on Narcan drip in ICU  DATE & TIME: 8/14/23 2105-1067  Cognitive Concerns/ Orientation: A & O x 4   BEHAVIOR & AGGRESSION TOOL COLOR: Green  ABNL VS/O2: VSS on RA except  bradycardic at rest  MOBILITY: Independent  PAIN MANAGMENT: 4/10 pain to sternum following CPR, intermittent Lido patch - received scheduled and PRN Tylenol  DIET: Regular.  BOWEL/BLADDER: Continent of B&B.   ABNL LAB/BG: Phosphorus 2.0, being orally replaced (recheck in AM)  DRAIN/DEVICES: PIV SL  TELEMETRY RHYTHM: N/A  SKIN: Intact. Rash to R upper arm most prominent to medial portion of bicep following use of latex tourniquet.  TESTS/PROCEDURES: None.  D/C DAY/GOALS/PLACE: TBD - chem dep consult completed -  SW has sent out referrals to [facility] inpatient treatment .   OTHER IMPORTANT INFO: Seizure precautions.

## 2023-08-16 NOTE — PLAN OF CARE
Goal Outcome Evaluation:                      Summary: Unintentional Fentanyl overdose-was on Narcan drip in ICU  DATE & TIME: 8/15/23, pm shift  Cognitive Concerns/ Orientation: A&Ox4   BEHAVIOR & AGGRESSION TOOL COLOR: Green.  ABNL VS/O2: VSS except HR lyndsay at times, in 50s. On RA.   MOBILITY: Independent  PAIN MANAGMENT: gave schedule Tylenol for c/o sternal pain. Lidocaine patch on chest removed; C/o left upper mouth soreness, PRN oral gel applied.  DIET: Regular.  BOWEL/BLADDER: Continent of B&B.   ABNL LAB/BG: Phosphorus 1.9 orally replacement completed. Phos to be recheck at 2300.  DRAIN/DEVICES: PIV SL  TELEMETRY RHYTHM: N/A  SKIN: Intact. Rash to R upper arm most prominent to medial portion of bicep following use of latex tourniquet. Red rash area on upper chest due to old tele patches.  TESTS/PROCEDURES: None.  D/C DAY/GOALS/PLACE: TBD - chem dep consult completed -  SHERRY has sent out referrals to [facility] inpatient treatment .   OTHER IMPORTANT INFO: Seizure precautions. Psych consult pending.

## 2023-08-16 NOTE — PLAN OF CARE
Goal Outcome Evaluation:       Summary: Unintentional Fentanyl overdose-was on Narcan drip in ICU    DATE & TIME: 8/15/2023 - 8/16/2023 [id number]    Cognitive Concerns/ Orientation: A&Ox4   BEHAVIOR & AGGRESSION TOOL COLOR: Green.  ABNL VS/O2: VSS except HR lyndsay at times, in 50s. On RA.   MOBILITY: Independent  PAIN MANAGMENT: c/o rib/sternum pain - gave PRN Ibuprofen x1 - effective. C/o stomach discomfort/nausea - PRN tums x1  DIET: Regular  BOWEL/BLADDER: Continent of B&B.   ABNL LAB/BG: Phos. Recheck 3.9  DRAIN/DEVICES: PIV SL  TELEMETRY RHYTHM: N/A  SKIN: Intact. Rash to R upper arm most prominent to medial portion of bicep following use of latex tourniquet. Red rash area on upper chest due to prior tele patches.  TESTS/PROCEDURES: None.  D/C DAY/GOALS/PLACE: TBD - chem dep consult completed -   has sent out referrals to [facility] inpatient treatment.   OTHER IMPORTANT INFO: Seizure precautions. Psych consult pending. Pt shared tonight 8/15 that when the EMS gave Narcan to him before he came in to hospital, he wishes they didn't save him. Denies suicidal thoughts & stated he was interested in starting CD treatment.

## 2023-08-16 NOTE — PROGRESS NOTES
[facility]    Medicine Progress Note - Hospitalist Service    Date of Admission:  8/10/2023    Assessment & Plan   Magy Garrett is a 23-year-old transgender male, history of meth and cocaine abuse, history of seizure disorder, bipolar disorder, PTSD, chemical dependency, recently in rehab and currently in outpatient treatment for his meth and cocaine abuse, started using fentanyl and was admitted with fentanyl overdose and neck respiratory failure.  He does have history of suicidal ideation suicide attempts in the past.     Acute respiratory arrest secondary to fentanyl overdose unintentional   Fentanyl overdose  Fentanyl abuse  History of meth and cocaine abuse  Suicidal ideation  Smoking fentanyl on the light rail when he overdosed, apparently received cardiopulmonary resuscitation and Narcan administration.  Mental status improved, but then recurrent somnolence and required multiple further doses of Narcan. Patient told prior hospitalist that his drugs of choice are meth and cocaine but has been sober and had inpatient treatment.  She started using fentanyl for the last 3 days only.  He was found intoxicated during one of the meeting for his chemical dependency.  -Admitted to ICU on narcan infusion.  -Able to be weaned off narcan infusion the day after admission.  -Transferred out of ICU on 8/11/23.  -He is alert awake and not requiring any oxygen.  -Chemical dependency consulted, appreciate their assistance.  -Psychiatry consulted, appreciate their assistance.  -[facility]. He does not want to go to inpatient psychiatry and does not meet their criteria for inpatient admission. He is also not holdable or committable at this point. Has been explained to the patient that he will remain at risk as long as he continues to use, and he has been encouraged to continue with pursuing recovery.   -Social work following, appreciate their assistance. Patient hopeful to  go to [facility] however it appears that they will not be able to accept him. Social work looking into other options.    Musculoskeletal chest pain  Troponin elevation  Very mildly elevated troponin initial 19, was 68 and then 46. This is likely secondary to CPR, and unlikely secondary to ACS. He has musculoskeletal chest pain which is reproducible on exam.  -Echocardiogram checked - results were normal.  -Continue lidocaine patches for chest pain and schedule Tylenol 1000 mg 3 times a day.  -Avoid narcotics.     Epilepsy  Initial onset of seizures in  following overdose.  Has followed with neurology as an outpatient and has an upcoming appointment with [facility] neurology.  Patient was on Topamax and lamotrigine prior to admission.  History of seizures the Monday prior to admission, mostly happen when he has been noncompliant with his medication.  -Seizure precautions.  -Continue PTA Topamax.  -PTA lamotrigine discontinued due to concern that it contributed to increased suicidal ideation.   -Discussed with psychiatry 8/15/23, planning to see patient today, comment on if lamotrigine could be considered in the future if needed.  -Video EEG on 23 was normal.     Passive suicidal ideation  Reports that he has no active suicidal or homicidal ideation, but was hoping that he would not be resuscitated if he .  -Psychiatry consulted, appreciate their assistance.  -Suicide precautions discontinued as per Psychiatry.     Tobacco use disorder in recent remission  Quit smoking cigarettes 3 weeks ago.  Previously had been smoking since age 12 from 1 to 4 packs/day.  -Continue to encourage tobacco cessation/abstinence.     Hypophosphatemia  -Replace and recheck per RN managed protocol.    Jaw pain  No significant abnormality noted on exam today.  -Symptomatic management with PRN acetaminophen and ibuprofen. Will also order prn Orajel.    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 If unable to obtain from inpatient pharmacy, would be OK with patient using outpatient medication.  -Continue Truvada.       Diet: Regular Diet Adult    DVT Prophylaxis: Low Risk/Ambulatory with no VTE prophylaxis indicated  Tierney Catheter: Not present  Lines: None     Cardiac Monitoring: None  Code Status: Full Code      Clinically Significant Risk Factors                                    Disposition Plan      Expected Discharge Date: 08/18/2023      Destination: [facility]  Discharge Comments: needs cd eval, inpt treatment          Chris Hwang MD  Hospitalist Service  [facility]  Securely message with [employer] (more info)  Text page via [facility] Paging/Directory   ______________________________________________________________________    Interval History   Magy Garrett was seen this afternoon. Would like weekly testosterone injection. Jaw discomfort improved with prn Orajel, occasional blood noted on the Q-tip after applying the Orajel. Appears that he will not be able to go to treatment at [facility] as he had desired, social work looking into other options.    Physical Exam   Vital Signs: Temp: 98.3  F (36.8  C) Temp src: Oral BP: 112/62 Pulse: 54   Resp: 18 SpO2: 96 % O2 Device: None (Room air)    Weight: 138 lbs 14.24 oz    Constitutional: awake, alert, cooperative, no apparent distress, sitting at the bedside    Medical Decision Making       30 MINUTES SPENT BY ME on the date of service doing chart review, history, exam, documentation & further activities per the note.      Data   NOTE: Data reviewed over the past 24 hrs contributes toward MDM complexity

## 2023-08-16 NOTE — PROGRESS NOTES
Initial Psychiatric Consult   Consult date: 2023         Reason for Consult, requesting source:    Suicidal ideation    Requesting source: Clarke Armani Sen    Labs and imaging reviewed. Patient seen and evaluated by Radha Pedroza MD          HPI:   This is a 23-year-old transgender male who presented to the emergency department after he overdosed on fentanyl while riding the light rail.  Patient has a history of methamphetamine and cocaine use, but has recently been using fentanyl.  He apparently has been living in a sober house for months and has been thought to be intoxicated at his treatment meetings.  Patient received multiple doses of Narcan and CPR at the scene.  Hospitalist indicates this was likely respiratory  distress, not acute coronary syndrome.    Patient indicated he was disappointed when he woke up to EMS shouting at him and indicated that he had  and was brought back.  He has a history of suicidality, and was recently started on Lamictal.  Reportedly he is worse on the Lamictal.  The Lamictal is for seizure disorder.    I did see the patient today, and discussed with RN.  Patient denies that he is suicidal currently, and also denies this was a suicide attempt.  He states that it may have been somewhat of a passive death wish, because he was upset when he woke up.  Patient does not want to go to the inpatient psychiatric unit, and states he has been hospitalized 15 times in the past and that it does not really help.  He denies that he is in imminent danger of harm to himself.  He does feel like he needs some help figuring out next steps.  He does not know where he is going to go from here, and wants to go to a sober living arrangement.      2023: Patient seen today for follow-up.  Discussed with substance abuse counselor.  Patient admits ambivalence about going to treatment, stating that he is craving using.  On the other hand, he really does want to stop using, and get  his life on track.  Wait a very long conversation about this.  He really wants to go somewhere that has substance abuse and mental health treatment.  Discussed this with the substance abuse counselor, who already was looking at 2 places that do have availability and can take him.    We also talked about whether the Lamictal may have caused any kind of mood issues in the past.  Patient denies this..  He does state that when he is not on antiseizure medications, that he does tend to have seizures.  Patient is not suicidal.        Past Psychiatric History:   [place] [facility] [place]23, and 7/6/2023.  Patient reportedly attempted to hang himself with CD treatment and was brought to the ED with marks noted on his neck.  He reported this was related to being bullied at [employer], so he tried to kill himself.  Patient reports multiple past psychiatric admissions in [place], with 15 reported prior hospitalizations.  Patient reportedly has a history of eating disorder        Substance Use and History:   Has a history of cocaine and methamphetamine use disorder.  Reportedly, he is sober from the substances but uses fentanyl.  He has completed treatment in a prior [place], and is currently receiving outpatient CD treatment at [facility], and living in sober housing.          Past Medical History:   PAST MEDICAL HISTORY: No past medical history on file.    Patient indicated during a neurology office visit that the onset of seizure was in February 2022 after the patient overdosed on cocaine in the back of a pickup truck.  Now he is reporting daily episodes where he will tense, twitching, but is awake and recalls the events.  Patient had refused to take Lamictal from May through July while he was in treatment,  Because he reportedly thought that it was a mood stabilizer, not on antiseizure medication.  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  EEG  "performed 7/24/2023 was normal.    MRI brain with and without contrast performed on 8/8/2023 showed multiple foci of abnormal T2 signal in the periventricular and deep cerebral white matter with a somewhat elongated appearance along the pericallosal white matter raising the question of a demyelinating disorder.  Correlation with CSF analysis was recommended.    PAST SURGICAL HISTORY: No past surgical history on file.          Family History:   FAMILY HISTORY: No family history on file.    Family Psychiatric History:         Social History:   SOCIAL HISTORY:   Social History     Tobacco Use    Smoking status: Not on file    Smokeless tobacco: Not on file   Substance Use Topics    Alcohol use: Yes     Comment: last drink May 24, 2023       Patient states that he would ultimately like to leave [place], and does not like being here.         Physical ROS:   The 10 point Review of Systems is negative other than noted in the HPI or here.           Medications:      acetaminophen  1,000 mg Oral TID    emtricitabine-tenofovir  1 tablet Oral Daily    lidocaine  2 patch Transdermal Q24H    sodium chloride (PF)  3 mL Intracatheter Q8H    testosterone cypionate  0.125 mL Intramuscular Weekly    topiramate  100 mg Oral BID              Allergies:     Allergies   Allergen Reactions    Lactose Unknown    Latex      Nitrile gloves     Pork Allergy Unknown     Denominational    Gluten Meal           Labs:     Recent Results (from the past 48 hour(s))   Phosphorus    Collection Time: 08/15/23 10:38 AM   Result Value Ref Range    Phosphorus 1.9 (L) 2.5 - 4.5 mg/dL   Phosphorus    Collection Time: 08/16/23 12:45 AM   Result Value Ref Range    Phosphorus 3.9 2.5 - 4.5 mg/dL   Glucose    Collection Time: 08/16/23 12:45 AM   Result Value Ref Range    Glucose 118 (H) 70 - 99 mg/dL          Physical and Psychiatric Examination:     /78 (BP Location: Right arm)   Pulse 63   Temp 97.8  F (36.6  C) (Oral)   Resp 16   Ht 1.6 m (5' 3\")  "  Wt 63 kg (138 lb 14.2 oz)   SpO2 96%   BMI 24.60 kg/m    Weight is 138 lbs 14.24 oz  Body mass index is 24.6 kg/m .    Physical Exam:  I have reviewed the physical exam as documented by by the medical team and agree with findings and assessment and have no additional findings to add at this time.    Mental Status Exam:    Appearance: awake, alert, adequately groomed, appeared as age stated, awake, alert, cooperative, and no apparent distress  Attitude:  cooperative  Eye Contact:  good  Mood:  better  Affect:  appropriate and in normal range and mood congruent  Speech:  clear, coherent  Language: Fluent in english   Psychomotor Behavior:  no evidence of tardive dyskinesia, dystonia, or tics  Thought Process:  logical  Associations:  no loose associations  Thought Content:  no evidence of suicidal ideation or homicidal ideation, no evidence of psychotic thought, no auditory hallucinations present, and no visual hallucinations present  Insight:  good  Judgement:  intact  Oriented to:  time, person, and place  Attention Span and Concentration:  intact  Recent and Remote Memory:  intact  Fund of Knowledge: Appropriate   Gait and Station:                DSM-5 Diagnosis:   Cocaine and methamphetamine use disorder, severe, dependence    Fentanyl abuse    Fentanyl overdose, unintentional, subsequent encounter              Assessment:   This is a 23-year-old male transgender patient who presents after an unintentional fentanyl overdose.  Patient states he was trying to get high, and OD'd.  He was disappointed when he was revived, but denies wanting to kill himself.  He does have a pretty significant mental health history, though difficult to know how this may be intertwined with substance use disorder.  He is sober from cocaine and methamphetamine use currently.    Patient wants to go back to sober living arrangement.  Will asked someone from our service to help coordinate this with him.  He does not want to go to  inpatient psychiatry and does not meet criteria.  He is also not holdable or committable at this point.  Explained to the patient that he will remain at risk as long as he continues to use, and encouraged him to continue with pursuing recovery.    Patient is ready to go for substance abuse treatment.  He wants someplace that his MI/CD.  Substance abuse counselor is helping with this.  Patient can go back on Lamictal from a psychiatric standpoint.  He denies any adverse side effects with this medication in the past.  Unclear if it gives any benefit as a mood stabilizer, but it does work for seizures.            Summary of Recommendations:    Patient is ok to discharge from a psychiatric standpoint when medically clear.     2.  Patient is okay to restart Lamictal from a psychiatric standpoint if neurology wants to do so.    3.  Psychiatry will sign off.      Radha Pedroza MD  Consult/Liaison Psychiatry and Addiction Medicine  [facility]

## 2023-08-16 NOTE — PLAN OF CARE
Goal Outcome Evaluation:  Cognitive Concerns/ Orientation: A&Ox4. Calm and cooperative. Relaxing in room on his phone and watching TV  BEHAVIOR & AGGRESSION TOOL COLOR: Green.  ABNL VS/O2: VSS except HR eveline- in 50s. On room air  MOBILITY: Independent  PAIN MANAGMENT: c/o rib/sternum pain and left rib pain- gave lidocaine patches, on tylenol  DIET: Regular-fair  BOWEL/BLADDER: Continent of B&B.   ABNL LAB/BG: DRAIN/DEVICES: PIV SL  TELEMETRY RHYTHM: N/A  SKIN: Intact. Rash to R upper arm most prominent to medial portion of bicep following use of latex tourniquet. Red rash area on upper chest due to prior tele patches-unchanged  TESTS/PROCEDURES: None.  D/C DAY/GOALS/PLACE: TBD   OTHER IMPORTANT INFO: Seizure precautions. Psych consult pending. Not accepted at [facility]. SW has referrals out

## 2023-08-16 NOTE — PROGRESS NOTES
Care Management Follow Up    Length of Stay (days): 3    Expected Discharge Date: 08/18/2023     Concerns to be Addressed:       Patient plan of care discussed at interdisciplinary rounds: Yes    Anticipated Discharge Disposition: Inpatient Chemical Dependency     Anticipated Discharge Services:    Anticipated Discharge DME:      Patient/family educated on Medicare website which has current facility and service quality ratings:    Education Provided on the Discharge Plan:    Patient/Family in Agreement with the Plan:      Referrals Placed by CM/SW:    Private pay costs discussed:  N/A    Additional Information:  Writer spoke with [facility] and they will not be able to accept the patient due to mental health concerns with the patient. Writer spoke with patient regarding being declined. Patient was tearful receiving the news and reported this was what happened when the patient was 17 years old and trying to get into treatment prior. Writer offered additional releases of information for [facility]. Writer sent these to  so referrals could be made for the patient. Writer spoke with bedside nurse to inform her of the patient's reaction to being rejected from treatment.     Praneeth Marrufo [facility]  Care Management

## 2023-08-16 NOTE — PROGRESS NOTES
Care Management Follow Up    Length of Stay (days): 3    Expected Discharge Date: 08/22/2023     Concerns to be Addressed:       Patient plan of care discussed at interdisciplinary rounds: Yes    Anticipated Discharge Disposition: Inpatient Chemical Dependency     Anticipated Discharge Services:    Anticipated Discharge DME:      Patient/family educated on Medicare website which has current facility and service quality ratings:    Education Provided on the Discharge Plan:    Patient/Family in Agreement with the Plan:      Referrals Placed by CM/SW:    Private pay costs discussed: Not applicable    Additional Information:  Writer received a phone call from [facility] asking if the patient had a psychiatry consultation. Writer confirmed the patient has been evaluated by psychiatry. Kim from [facility] requested the psychiatry note for the patient and provided a fax number of [phone]. Writer faxed the note via epic. SW will continue to follow for discharge planning.     [facility]  Care [facility]

## 2023-08-17 NOTE — PROGRESS NOTES
Goal Outcome Evaluation:  Summary: Unintentional Fentanyl overdose-was on Narcan drip in ICU  DATE & TIME: 8/16/23 night.  Cognitive Concerns/ Orientation: A&Ox4. Calm and cooperative.   BEHAVIOR & AGGRESSION TOOL COLOR: Green.  ABNL VS/O2: HR eveline (55) all other VSS on RA.  MOBILITY: Independent  PAIN MANAGMENT: Denies.  DIET: Regular  BOWEL/BLADDER: Continent of B&B.   ABNL LAB/BG: DRAIN/DEVICES: None  TELEMETRY RHYTHM: N/A  SKIN: Intact. Rash to R upper arm most prominent to medial portion of bicep following use of latex tourniquet. Red rash area on upper chest due to prior tele patches-unchanged  TESTS/PROCEDURES: None.  D/C DAY/GOALS/PLACE: TBD   OTHER IMPORTANT INFO: Seizure precautions. Psych consult pending. Not accepted at [facility]. SW has referrals out

## 2023-08-17 NOTE — PLAN OF CARE
8/10/23 admit, Accidental Overdose  8/17/23 shift, 7 a to 7 p    Orientation: A&O, calm and pleasant    Vitals/Tele: Bradycardic, Asymptomatic on RA, Tylenol for pain and Ice packs    IV Access/drains: No IV access    Diet: Regular. Zofran x 1 given for nausea    Mobility: Independent    GI/: Continent    Wound/Skin: Rash on chest from Tele stickers    Consults: Hospitalist following    Discharge Plan: SW/CC following placement for inpatient treatment facility      See Flow sheets for assessment

## 2023-08-17 NOTE — PLAN OF CARE
Goal Outcome Evaluation:      Plan of Care Reviewed With: patient      Summary: Unintentional Fentanyl overdose-was on Narcan drip in ICU    DATE & TIME: 8/17/2023 4398-2503    Cognitive Concerns/ Orientation: A&Ox4.   BEHAVIOR & AGGRESSION TOOL COLOR: Green.  ABNL VS/O2: VSS except HR eveline- in 50s. On room air  MOBILITY: Independent  PAIN MANAGMENT: c/o rib/sternum pain and left rib pain,upper jaw pain-scheduled tylenol  DIET: Regular  BOWEL/BLADDER: Continent of B&B.   ABNL LAB/BG:   DRAIN/DEVICES:No IV access   TELEMETRY RHYTHM: N/A  SKIN: Intact. Rash to R upper arm most prominent to medial portion of bicep following use of latex tourniquet. Red rash area on upper chest due to prior tele patches-unchanged  TESTS/PROCEDURES: None.  D/C DAY/GOALS/PLACE: TBD   OTHER IMPORTANT INFO: Seizure precautions. Psych consult pending. Not accepted at [facility]. SW has referrals out

## 2023-08-17 NOTE — PROGRESS NOTES
[facility]    Medicine Progress Note - Hospitalist Service    Date of Admission:  8/10/2023    Assessment & Plan   Magy Garrett is a 23-year-old transgender male, history of meth and cocaine abuse, history of seizure disorder, bipolar disorder, PTSD, chemical dependency, recently in rehab and currently in outpatient treatment for his meth and cocaine abuse, started using fentanyl and was admitted with fentanyl overdose and neck respiratory failure.  He does have history of suicidal ideation suicide attempts in the past.     Acute respiratory arrest secondary to fentanyl overdose unintentional   Fentanyl overdose  Fentanyl abuse  History of meth and cocaine abuse  Suicidal ideation  Smoking fentanyl on the light rail when he overdosed, apparently received cardiopulmonary resuscitation and Narcan administration.  Mental status improved, but then recurrent somnolence and required multiple further doses of Narcan. Patient told prior hospitalist that his drugs of choice are meth and cocaine but has been sober and had inpatient treatment.  She started using fentanyl for the last 3 days only.  He was found intoxicated during one of the meeting for his chemical dependency.  -Admitted to ICU on narcan infusion.  -Able to be weaned off narcan infusion the day after admission.  -Transferred out of ICU on 8/11/23.  -He is alert awake and not requiring any oxygen.  -Chemical dependency consulted, appreciate their assistance.  -Psychiatry consulted, appreciate their assistance.  -He wants to go back to [facility]. He does not want to go to inpatient psychiatry and does not meet their criteria for inpatient admission. He is also not holdable or committable at this point. Has been explained to the patient that he will remain at risk as long as he continues to use, and he has been encouraged to continue with pursuing recovery.   -Social work following, appreciate their assistance. Patient hopeful to  go to [facility] however it appears that they will not be able to accept him. Social work looking into other options.  -Suspect he is at high risk of relapse if discharged home prior to substance abuse treatment being arranged, will continue inpatient care while awaiting placement in a treatment center.    Musculoskeletal chest pain  Troponin elevation  Very mildly elevated troponin initial 19, was 68 and then 46. This is likely secondary to CPR, and unlikely secondary to ACS. He has musculoskeletal chest pain which is reproducible on exam.  -Echocardiogram checked - results were normal.  -Continue lidocaine patches for chest pain and schedule Tylenol 1000 mg 3 times a day.  -Avoid narcotics.     Epilepsy  Initial onset of seizures in  following overdose.  Has followed with neurology as an outpatient and has an upcoming appointment with [facility] neurology.  Patient was on Topamax and lamotrigine prior to admission.  History of seizures the Monday prior to admission, mostly happen when he has been noncompliant with his medication.  -Seizure precautions.  -Continue PTA Topamax.  -PTA lamotrigine discontinued initially due to concern that it contributed to increased suicidal ideation.   -Psychiatry re-consulted 23, lamotrigine was not felt to be causing adverse side effects, OK to restart lamotrigine in the future if needed.  -Discussed with neurology on 23, no plan to restart lamotrigine during this admission, recommend outpatient follow-up with his Neurologist.  -Video EEG on 23 was normal.     Passive suicidal ideation  Reports that he has no active suicidal or homicidal ideation, but was hoping that he would not be resuscitated if he .  -Psychiatry consulted, appreciate their assistance.  -Suicide precautions discontinued as per Psychiatry.     Tobacco use disorder in recent remission  Quit smoking cigarettes 3 weeks ago.  Previously had been smoking since age 12 from 1 to 4  packs/day.  -Continue to encourage tobacco cessation/abstinence.     Hypophosphatemia  -Replace and recheck per RN managed protocol.    Canker sore  -Symptomatic management with PRN Orajel.    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  -Continue Truvada.       Diet: Regular Diet Adult    DVT Prophylaxis: Low Risk/Ambulatory with no VTE prophylaxis indicated  Tierney Catheter: Not present  Lines: None     Cardiac Monitoring: None  Code Status: Full Code      Clinically Significant Risk Factors                                    Disposition Plan      Expected Discharge Date: 08/21/2023      Destination: [facility]  Discharge Comments: needs cd eval, inpt treatment          Chris Hwang MD  Hospitalist Service  [facility]  Securely message with [employer] (more info)  Text page via [employer] Paging/Directory   ______________________________________________________________________    Interval History   Magy Garrett was seen this morning. In a good mood this morning. No new complaints/concerns. Mouth/jaw pain appears to be due to a canker sore, symptoms improved with Orajel. Denies fevers, chest pain, shortness of breath, nausea, abdominal pain. Has another facility he would like to consider for treatment, will update social work.    Physical Exam   Vital Signs: Temp: 97.9  F (36.6  C) Temp src: Oral BP: 120/74 Pulse: 51   Resp: 16 SpO2: 95 % O2 Device: None (Room air)    Weight: 138 lbs 14.24 oz    Constitutional: awake, alert, cooperative, no apparent distress, sitting up in the hospital bed  Respiratory: no increased work of breathing, clear to auscultation bilaterally, no crackles or wheezing  Cardiovascular: regular rate and rhythm, normal S1 and S2, no murmur noted  GI: normal bowel sounds, soft, non-distended, non-tender  Skin: warm, dry  Musculoskeletal: no lower extremity pitting edema  present  Neurologic: awake, alert, answers questions appropriately, moves all extremities    Medical Decision Making       30 MINUTES SPENT BY ME on the date of service doing chart review, history, exam, documentation & further activities per the note.      Data

## 2023-08-17 NOTE — CONSULTS
Triage and Transition - Consult and Liaison     Magy Garrett  August 17, 2023    Psychiatry consult acknowledged. Patient seen by Dr. Pedroza on 8/17, psychiatry has signed off. Please see her note.     Kaylie Bravo, [facility]   Triage and Transition - Consult and Liaison   [phone]

## 2023-08-17 NOTE — PROGRESS NOTES
Care Management Follow Up    Length of Stay (days): 4    Expected Discharge Date: 08/21/2023     Concerns to be Addressed:       Patient plan of care discussed at interdisciplinary rounds: Yes    Anticipated Discharge Disposition: Inpatient Chemical Dependency     Anticipated Discharge Services:    Anticipated Discharge DME:      Patient/family educated on Medicare website which has current facility and service quality ratings:    Education Provided on the Discharge Plan:    Patient/Family in Agreement with the Plan:      Referrals Placed by CM/SW:    Private pay costs discussed: Not applicable    Additional Information:  Writer spoke with MD about patient's status with discharge. Patient spoke about [facility] as a possible discharge place. This facility is under [facility] and a referral has already been sent to them. [facility] only takes private pay and private insurance and will not take medicaid for payment. This will likely prevent patient from going to [facility]. Referrals have been sent out to [facility] and to [facility]. [facility] was called by worker and the patient will be receiving a phone call from the treatment facility to receive a phone interview. Writer called [facility] to check on the status of the patient's referral. [facility] is still referring the patient's referral and will get back to adebayor with an updated status.     Writer spoke with patient at beside and updated patient regarding referrals. Patient wanted a referral to be placed at [facility]. Writer informed patient that this is a [place] facility and if he was appropriate for the [facility] facility.     SW will continue to follow for discharge planning.     Praneeth Marrufo [facility]  Care Management

## 2023-08-18 NOTE — PLAN OF CARE
Goal Outcome Evaluation:    Goal Outcome Evaluation:  Summary: Unintentional Fentanyl overdose-was on Narcan drip in ICU  DATE & TIME: 8/17/23 [id number]  Cognitive Concerns/ Orientation: A&Ox4. Calm and cooperative.   BEHAVIOR & AGGRESSION TOOL COLOR: Green.  ABNL VS/O2: baseline bradycardia. all other VSS on RA.  MOBILITY: Independent  PAIN MANAGMENT: Denies.  DIET: Regular  BOWEL/BLADDER: Continent of B&B.   ABNL LAB/BG: DRAIN/DEVICES: None  TELEMETRY RHYTHM: N/A  SKIN: Intact. Rash to RUE  TESTS/PROCEDURES: None.  D/C DAY/GOALS/PLACE: D   OTHER IMPORTANT INFO: Seizure precautions. SW has referrals out for inpatient treatment facilities

## 2023-08-18 NOTE — PROGRESS NOTES
"Care Management Follow Up    Length of Stay (days): 5    Expected Discharge Date: 08/24/2023     Concerns to be Addressed:       Patient plan of care discussed at interdisciplinary rounds: Yes    Anticipated Discharge Disposition: Inpatient Chemical Dependency     Anticipated Discharge Services:    Anticipated Discharge DME:      Patient/family educated on Medicare website which has current facility and service quality ratings:    Education Provided on the Discharge Plan:    Patient/Family in Agreement with the Plan:      Referrals Placed by CM/SW:    Private pay costs discussed: Not applicable    Additional Information:  Writer spoke with referral [facility] and they are not able to accept the patient due to current struggle with eating disorder, and previous suicide attempts. Writer spoke with Mirella at [facility] and they had said they have not received the referral form form the hospital yet. Writer took down Mirella's information of Email: [email or web address], Phone number of [phone] and Fax of [phone]. Writer sent referral through fax to admissions at [facility]. Writer sent an email to Mirella including the following:     \"Arline Vu,    I sent the referral for Gregory Garrett (Adriana) through fax. Please let me know if you did not receive it or if you need additional information. My phone number is [phone].     Thanks!\"    Praneeth Marrufo [facility]  Care Management    "

## 2023-08-18 NOTE — PLAN OF CARE
Goal Outcome Evaluation:  Summary: Unintentional Fentanyl overdose-was on Narcan drip in ICU  DATE & TIME: 8/17/23 5656-9058  Cognitive Concerns/ Orientation: A&Ox4. Calm and cooperative.   BEHAVIOR & AGGRESSION TOOL COLOR: Green.  ABNL VS/O2: baseline bradycardia. all other VSS on RA.  MOBILITY: Independent  PAIN MANAGMENT: Denies.  DIET: Regular  BOWEL/BLADDER: Continent of B&B.   ABNL LAB/BG: DRAIN/DEVICES: None  TELEMETRY RHYTHM: N/A  SKIN: Intact. Rash to R upper arm. Red rash area on upper chest due to prior tele patches  TESTS/PROCEDURES: None.  D/C DAY/GOALS/PLACE: TBD   OTHER IMPORTANT INFO: Seizure precautions. SW has referrals out for inpatient treatment facilities

## 2023-08-18 NOTE — PROGRESS NOTES
[facility]    Medicine Progress Note - Hospitalist Service    Date of Admission:  8/10/2023    Assessment & Plan   Magy Garrett is a 23-year-old transgender male, history of meth and cocaine abuse, history of seizure disorder, bipolar disorder, PTSD, chemical dependency, recently in rehab and currently in outpatient treatment for his meth and cocaine abuse, started using fentanyl and was admitted with fentanyl overdose and neck respiratory failure.  He does have history of suicidal ideation suicide attempts in the past.     Acute respiratory arrest secondary to fentanyl overdose unintentional   Fentanyl overdose  Fentanyl abuse  History of meth and cocaine abuse  Suicidal ideation  Smoking fentanyl on the light rail when he overdosed, apparently received cardiopulmonary resuscitation and Narcan administration.  Mental status improved, but then recurrent somnolence and required multiple further doses of Narcan. Patient told prior hospitalist that his drugs of choice are meth and cocaine but has been sober and had inpatient treatment.  She started using fentanyl for the last 3 days only.  He was found intoxicated during one of the meeting for his chemical dependency.  -Admitted to ICU on narcan infusion.  -Able to be weaned off narcan infusion the day after admission.  -Transferred out of ICU on 8/11/23.  -He is alert awake and not requiring any oxygen.  -Chemical dependency consulted, appreciate their assistance.  -Psychiatry consulted, appreciate their assistance.  -[facility]. He does not want to go to inpatient psychiatry and does not meet their criteria for inpatient admission. He is also not holdable or committable at this point. Has been explained to the patient that he will remain at risk as long as he continues to use, and he has been encouraged to continue with pursuing recovery.   -Social work following, appreciate their assistance. Patient hopeful to  go to [facility] however it appears that they will not be able to accept him. Social work looking into other options.  -Suspect he is at high risk of relapse if discharged home prior to substance abuse treatment being arranged, will continue inpatient care while awaiting placement in a treatment center.    Musculoskeletal chest pain  Troponin elevation  Very mildly elevated troponin initial 19, was 68 and then 46. This is likely secondary to CPR, and unlikely secondary to ACS. He has musculoskeletal chest pain which is reproducible on exam.  -Echocardiogram checked - results were normal.  -Will stop lidocaine patches as they no longer seem to be effective.  -Continue scheduled Tylenol 1000 mg 3 times a day.  -Avoid narcotics.     Epilepsy  Initial onset of seizures in  following overdose.  Has followed with neurology as an outpatient and has an upcoming appointment with [facility] neurology.  Patient was on Topamax and lamotrigine prior to admission.  History of seizures the Monday prior to admission, mostly happen when he has been noncompliant with his medication.  -Seizure precautions.  -Continue PTA Topamax.  -PTA lamotrigine discontinued initially due to concern that it contributed to increased suicidal ideation.   -Psychiatry re-consulted 23, lamotrigine was not felt to be causing adverse side effects, OK to restart lamotrigine in the future if needed.  -Discussed with neurology on 23, no plan to restart lamotrigine during this admission, recommend outpatient follow-up with his Neurologist.  -Video EEG on 23 was normal.     Passive suicidal ideation  Reports that he has no active suicidal or homicidal ideation, but was hoping that he would not be resuscitated if he .  -Psychiatry consulted, appreciate their assistance.  -Suicide precautions discontinued as per Psychiatry.     Tobacco use disorder in recent remission  Quit smoking cigarettes 3 weeks ago.  Previously had been  smoking since age 12 from 1 to 4 packs/day.  -Continue to encourage tobacco cessation/abstinence.     Hypophosphatemia  -Replace and recheck per RN managed protocol.    Canker sore  -Symptomatic management with PRN Orajel.    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  -Continue Truvada.       Diet: Regular Diet Adult    DVT Prophylaxis: Low Risk/Ambulatory with no VTE prophylaxis indicated  Tierney Catheter: Not present  Lines: None     Cardiac Monitoring: None  Code Status: Full Code      Clinically Significant Risk Factors                                    Disposition Plan     Expected Discharge Date: 08/21/2023      Destination: [facility]  Discharge Comments: needs cd eval, inpt treatment          Chris Hwang MD  Hospitalist Service  [facility]  Securely message with [employer] (more info)  Text page via [employer] Paging/Directory   ______________________________________________________________________    Interval History   Gregory Tami was seen this morning. No new complaints/concerns. Still awaiting placement.    Physical Exam   Vital Signs: Temp: 98.2  F (36.8  C) Temp src: Oral BP: 128/70 Pulse: 54   Resp: 16 SpO2: 97 % O2 Device: None (Room air)    Weight: 138 lbs 14.24 oz    Constitutional: awake, alert, cooperative, no apparent distress, walking around the hospital room.    Medical Decision Making       25 MINUTES SPENT BY ME on the date of service doing chart review, history, exam, documentation & further activities per the note.      Data   NOTE: Data reviewed over the past 24 hrs contributes toward MDM complexity

## 2023-08-19 NOTE — PROGRESS NOTES
[facility]    Medicine Progress Note - Hospitalist Service    Date of Admission:  8/10/2023    Assessment & Plan   Magy Garrett is a 23-year-old transgender male, history of meth and cocaine abuse, history of seizure disorder, bipolar disorder, PTSD, chemical dependency, recently in rehab and currently in outpatient treatment for his meth and cocaine abuse, started using fentanyl and was admitted with fentanyl overdose and neck respiratory failure.  He does have history of suicidal ideation suicide attempts in the past.     Acute respiratory arrest secondary to fentanyl overdose unintentional   Fentanyl overdose  Fentanyl abuse  History of meth and cocaine abuse  Suicidal ideation  Smoking fentanyl on the light rail when he overdosed, apparently received cardiopulmonary resuscitation and Narcan administration.  Mental status improved, but then recurrent somnolence and required multiple further doses of Narcan. Patient told prior hospitalist that his drugs of choice are meth and cocaine but has been sober and had inpatient treatment.  She started using fentanyl for the last 3 days only.  He was found intoxicated during one of the meeting for his chemical dependency.  -Admitted to ICU on narcan infusion.  -Able to be weaned off narcan infusion the day after admission.  -Transferred out of ICU on 8/11/23.  -He is alert awake and not requiring any oxygen.  -Chemical dependency consulted, appreciate their assistance.  -Psychiatry consulted, appreciate their assistance.  -[facility]. He does not want to go to inpatient psychiatry and does not meet their criteria for inpatient admission. He is also not holdable or committable at this point. Has been explained to the patient that he will remain at risk as long as he continues to use, and he has been encouraged to continue with pursuing recovery.   -Social work following, appreciate their assistance. Patient hopeful to  go to [facility] however it appears that they will not be able to accept him. Social work looking into other options.  -Suspect he is at high risk of relapse if discharged home prior to substance abuse treatment being arranged, will continue inpatient care while awaiting placement in a treatment center.    Musculoskeletal chest pain  Troponin elevation  Very mildly elevated troponin initial 19, was 68 and then 46. This is likely secondary to CPR, and unlikely secondary to ACS. He has musculoskeletal chest pain which is reproducible on exam.  -Echocardiogram checked - results were normal.  -Lidocaine patches discontinued 23 as they no longer seemed to be effective.  -Continue scheduled Tylenol 1000 mg 3 times a day.  -Avoid narcotics.     Epilepsy  Initial onset of seizures in  following overdose.  Has followed with neurology as an outpatient and has an upcoming [place] [facility] [place].  Patient was on Topamax and lamotrigine prior to admission.  History of seizures the Monday prior to admission, mostly happen when he has been noncompliant with his medication.  -Seizure precautions.  -Continue PTA Topamax.  -PTA lamotrigine discontinued initially due to concern that it contributed to increased suicidal ideation.   -Psychiatry re-consulted 23, lamotrigine was not felt to be causing adverse side effects, OK to restart lamotrigine in the future if needed.  -Discussed with neurology on 23, no plan to restart lamotrigine during this admission, recommend outpatient follow-up with his Neurologist.  -Video EEG on 23 was normal.     Passive suicidal ideation  Reports that he has no active suicidal or homicidal ideation, but was hoping that he would not be resuscitated if he .  -Psychiatry consulted, appreciate their assistance.  -Suicide precautions discontinued as per Psychiatry.     Tobacco use disorder in recent remission  Quit smoking cigarettes 3 weeks ago.  Previously  had been smoking since age 12 from 1 to 4 packs/day.  -Continue to encourage tobacco cessation/abstinence.     Hypophosphatemia  -Replace and recheck per RN managed protocol.    Canker sore  -Symptomatic management with PRN Orajel.    Indigestion  Patient attributes this to soy milk he drank yesterday.  -PRN Pepto-Bismol ordered.    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  -Continue Truvada.       Diet: Regular Diet Adult    DVT Prophylaxis: Low Risk/Ambulatory with no VTE prophylaxis indicated  Tierney Catheter: Not present  Lines: None     Cardiac Monitoring: None  Code Status: Full Code      Clinically Significant Risk Factors                                    Disposition Plan     Expected Discharge Date: 08/24/2023      [facility]  Discharge Comments: needs cd eval, inpt treatment          Chris Hwang MD  Hospitalist Service  [facility]  Securely message with [employer] (more info)  Text page via [employer] Paging/Directory   ______________________________________________________________________    Interval History   Gregory PANTOJA Tami was seen this morning. He complains of an upset stomach, attributes it to some soy milk he drank yesterday. No diarrhea, fevers, chest pain, shortness of breath. Would like to try some Pepto-Bismol for the upset stomach. No other complaints/concerns.    Physical Exam   Vital Signs: Temp: 98.2  F (36.8  C) Temp src: Oral BP: 110/61 Pulse: 52   Resp: 18 SpO2: 98 % O2 Device: None (Room air)    Weight: 138 lbs 14.24 oz    Constitutional: awake, alert, cooperative, no apparent distress, laying in the hospital bed    Medical Decision Making       30 MINUTES SPENT BY ME on the date of service doing chart review, history, exam, documentation & further activities per the note.      Data   NOTE: Data reviewed over the past 24 hrs contributes toward MDM complexity

## 2023-08-19 NOTE — PLAN OF CARE
Goal Outcome Evaluation:  Summary: Unintentional Fentanyl overdose-was on Narcan drip in ICU  DATE & TIME: 8/17/23 NOC  Cognitive Concerns/ Orientation: A&Ox4. Calm and cooperative.   BEHAVIOR & AGGRESSION TOOL COLOR: Green.  ABNL VS/O2: baseline bradycardia. all other VSS on RA.  MOBILITY: Independent  PAIN MANAGMENT: Denies.  DIET: Regular  BOWEL/BLADDER: Continent of B&B.   ABNL LAB/BG: DRAIN/DEVICES: Phos 2.2 replaced recheck this morning  TELEMETRY RHYTHM: N/A  SKIN: Intact. Rash to RUE  TESTS/PROCEDURES: None.  D/C DAY/GOALS/PLACE: TBD   OTHER IMPORTANT INFO: Seizure precautions. SW has referrals out for inpatient treatment facilities

## 2023-08-19 NOTE — PLAN OF CARE
Goal Outcome Evaluation:    Pt complains of mild discomfort to chest, complained of an upset stomach, but got good relief with Pepto-Bismol, Up independently, Plan to discharge to inpatient CD treatment.

## 2023-08-20 NOTE — PLAN OF CARE
Goal Outcome Evaluation:  Summary: Unintentional Fentanyl overdose-was on Narcan drip in ICU  DATE & TIME: 8/19/23 NOC  Cognitive Concerns/ Orientation: A&Ox4. Calm and cooperative.   BEHAVIOR & AGGRESSION TOOL COLOR: Green.  ABNL VS/O2: VSS on RA  MOBILITY: Independent  PAIN MANAGMENT: Scheduled Tylenol given  DIET: Regular  BOWEL/BLADDER: Continent of B&B.   ABNL LAB/BG: DRAIN/DEVICES: n/a  TELEMETRY RHYTHM: N/A  SKIN: Intact. Rash to RUE  TESTS/PROCEDURES: None.  D/C DAY/GOALS/PLACE: TBD   OTHER IMPORTANT INFO: Seizure precautions. SW has referrals out for inpatient treatment facilities

## 2023-08-20 NOTE — PROGRESS NOTES
[facility]    Medicine Progress Note - Hospitalist Service    Date of Admission:  8/10/2023    Assessment & Plan   Magy Garrett is a 23-year-old transgender male, history of meth and cocaine abuse, history of seizure disorder, bipolar disorder, PTSD, chemical dependency, recently in rehab and currently in outpatient treatment for his meth and cocaine abuse, started using fentanyl and was admitted with fentanyl overdose and neck respiratory failure.  He does have history of suicidal ideation suicide attempts in the past.     Acute respiratory arrest secondary to fentanyl overdose unintentional   Fentanyl overdose  Fentanyl abuse  History of meth and cocaine abuse  Suicidal ideation  Smoking fentanyl on the light rail when he overdosed, apparently received cardiopulmonary resuscitation and Narcan administration.  Mental status improved, but then recurrent somnolence and required multiple further doses of Narcan. Patient told prior hospitalist that his drugs of choice are meth and cocaine but has been sober and had inpatient treatment.  She started using fentanyl for the last 3 days only.  He was found intoxicated during one of the meeting for his chemical dependency.  -Admitted to ICU on narcan infusion.  -Able to be weaned off narcan infusion the day after admission.  -Transferred out of ICU on 8/11/23.  -He is alert awake and not requiring any oxygen.  -Chemical dependency consulted, appreciate their assistance.  -Psychiatry consulted, appreciate their assistance.  -[facility]. He does not want to go to inpatient psychiatry and does not meet their criteria for inpatient admission. He is also not holdable or committable at this point. Has been explained to the patient that he will remain at risk as long as he continues to use, and he has been encouraged to continue with pursuing recovery.   -Social work following, appreciate their assistance. Patient hopeful to  go to [facility] however it appears that they will not be able to accept him. Social work looking into other options.  -Suspect he is at high risk of relapse if discharged home prior to substance abuse treatment being arranged, will continue inpatient care while awaiting placement in a treatment center.    Musculoskeletal chest pain  Troponin elevation  Very mildly elevated troponin initial 19, was 68 and then 46. This is likely secondary to CPR, and unlikely secondary to ACS. He has musculoskeletal chest pain which is reproducible on exam.  -Echocardiogram checked - results were normal.  -Lidocaine patches discontinued 23 as they no longer seemed to be effective.  -Continue scheduled Tylenol 1000 mg 3 times a day.  -PRN ibuprofen and voltaren gel available.  -Avoid narcotics.     Epilepsy  Initial onset of seizures in  following overdose.  Has followed with neurology as an outpatient and has an [place] [facility] [place]  Patient was on Topamax and lamotrigine prior to admission.  History of seizures the Monday prior to admission, mostly happen when he has been noncompliant with his medication.  -Seizure precautions.  -Continue PTA Topamax.  -PTA lamotrigine discontinued initially due to concern that it contributed to increased suicidal ideation.   -Psychiatry re-consulted 23, lamotrigine was not felt to be causing adverse side effects, OK to restart lamotrigine in the future if needed.  -Discussed with neurology on 23, no plan to restart lamotrigine during this admission, recommend outpatient follow-up with his Neurologist.  -Video EEG on 23 was normal.     Passive suicidal ideation  Reports that he has no active suicidal or homicidal ideation, but was hoping that he would not be resuscitated if he .  -Psychiatry consulted, appreciate their assistance.  -Suicide precautions discontinued as per Psychiatry.     Tobacco use disorder in recent remission  Quit  smoking cigarettes 3 weeks ago.  Previously had been smoking since age 12 from 1 to 4 packs/day.  -Continue to encourage tobacco cessation/abstinence.     Hypophosphatemia  -Replace and recheck per RN managed protocol.    Canker sore  -Symptomatic management with PRN Orajel. Improved.    Indigestion  Noted on 8/19/23. Resolved with PRN Pepto-Bismol.  -Monitor.    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  -Continue Truvada.       Diet: Regular Diet Adult    DVT Prophylaxis: Low Risk/Ambulatory with no VTE prophylaxis indicated  Tierney Catheter: Not present  Lines: None     Cardiac Monitoring: None  Code Status: Full Code      Clinically Significant Risk Factors                                    Disposition Plan      Expected Discharge Date: 08/22/2023    Discharge Delays: *Medically Ready for Discharge  Placement - Mental Health/Addiction/Geripsych  [facility]  Discharge Comments: needs cd eval, inpt treatment          Chris Hwang MD  Hospitalist Service  [facility]  Securely message with [employer] (more info)  Text page via [employer] Paging/Directory   ______________________________________________________________________    Interval History   Gregory Garrett was seen this morning. He feels OK today. Mouth pain improved. Upset stomach resolved. Still some chest/rib discomfort, has scheduled tylenol, hasn't been using ibuprofen, added voltaren gel. No new complaints/concerns.    Physical Exam   Vital Signs: Temp: 97.8  F (36.6  C) Temp src: Oral BP: 106/60 Pulse: (!) 49   Resp: 18 SpO2: 97 % O2 Device: None (Room air)    Weight: 138 lbs 14.24 oz    Constitutional: awake, alert, cooperative, no apparent distress, sitting up in the hospital bed    Medical Decision Making       25 MINUTES SPENT BY ME on the date of service doing chart review, history, exam, documentation & further activities per the  note.      Data   NOTE: Data reviewed over the past 24 hrs contributes toward MDM complexity

## 2023-08-21 NOTE — PLAN OF CARE
Goal Outcome Evaluation:  Goal Outcome Evaluation:  Summary: Unintentional Fentanyl overdose  DATE & TIME: 8/21/23 4731-2581  Cognitive Concerns/ Orientation: A&Ox4. Calm and cooperative.   BEHAVIOR & AGGRESSION TOOL COLOR: Green.  ABNL VS/O2: VSS on RA, VS q4  MOBILITY: Independent  PAIN MANAGMENT: Scheduled tylenol, Voltaren cream  DIET: Regular  BOWEL/BLADDER: Continent of B&B.   ABNL LAB/BG: No labs today  DRAIN/DEVICES: n/a  TELEMETRY RHYTHM: N/A  SKIN: Intact. Rash to RUE  TESTS/PROCEDURES: None.  D/C DAY/GOALS/PLACE: Medically ready for discharge pending Placement to MH/addiction/Geripsych  OTHER IMPORTANT INFO: Seizure precautions. SW has referrals out for inpatient treatment facilities. Patient is allergic to latex and prefers to put Voltaren gel on himself

## 2023-08-21 NOTE — PROGRESS NOTES
[facility]  Medicine Progress Note - Hospitalist Service  Date of Admission:  8/10/2023    Assessment & Plan   Gregory Garrett is a 23-year-old transgender male, history of meth and cocaine abuse, history of seizure disorder, bipolar disorder, PTSD, chemical dependency, recently in rehab and currently in outpatient treatment for his meth and cocaine abuse, started using fentanyl and was admitted with fentanyl overdose and neck respiratory failure.  He does have history of suicidal ideation suicide attempts in the past.     Acute respiratory arrest secondary to fentanyl overdose unintentional   Fentanyl abuse  History of meth and cocaine abuse  Smoking fentanyl on the light rail when he overdosed, apparently received cardiopulmonary resuscitation and Narcan administration.  Mental status improved, but then recurrent somnolence and required multiple further doses of Narcan. Patient told prior hospitalist that his drugs of choice are meth and cocaine but has been sober and had inpatient treatment.  She started using fentanyl for the last 3 days only.  He was found intoxicated during one of the meeting for his chemical dependency. Admitted to ICU on narcan infusion. Able to be weaned off narcan infusion the day after admission. Transferred out of ICU on 8/11/23.  -Chemical dependency consulted, appreciate their assistance.  -Psychiatry consulted, appreciate their assistance.  -He wants to go back to sober living. He does not want to go to inpatient psychiatry and does not meet their criteria for inpatient admission. He is also not holdable or committable at this point. Has been explained to the patient that he will remain at risk as long as he continues to use, and he has been encouraged to continue with pursuing recovery.   -Social work following, appreciate their assistance.  -Suspect he is at high risk of relapse if discharged home prior to substance abuse treatment being arranged, will  continue inpatient care while awaiting placement in a treatment center.     Musculoskeletal chest pain  Troponin elevation  Very mildly elevated troponin initial 19, was 68 and then 46. This is likely secondary to CPR, and unlikely secondary to ACS. Has musculoskeletal chest pain which is reproducible on exam. Echocardiogram checked - results were normal.  -Lidocaine patches discontinued 23 as they no longer seemed to be effective.  -Continue scheduled Tylenol 1000 mg 3 times a day.  -PRN ibuprofen and voltaren gel available.  -Avoid narcotics.     Epilepsy  Initial onset of seizures in  following overdose.  Has followed with neurology as an outpatient and has an upcoming appointment with [facility] neurology. Was on Topamax and lamotrigine prior to admission. History of seizures the Monday prior to admission, mostly happen when he had been noncompliant with his medication. PTA lamotrigine discontinued initially due to concern that it contributed to increased suicidal ideation. Psychiatry re-consulted 23, lamotrigine was not felt to be causing adverse side effects, OK to restart lamotrigine in the future if needed. Discussed with neurology on 23, no plan to restart lamotrigine during this admission, recommend outpatient follow-up with his Neurologist. Video EEG on 23 was normal.   -Seizure precautions.  -Continue PTA Topamax.     Passive suicidal ideation  Reports that he has no active suicidal or homicidal ideation, but was hoping that he would not be resuscitated if he .  -Psychiatry consulted, appreciate their assistance  -Suicide precautions discontinued     Tobacco use disorder in recent remission  Quit smoking cigarettes 3 weeks ago.  Previously had been smoking since age 12 from 1 to 4 packs/day.  -Continue to encourage tobacco cessation/abstinence.     Hypophosphatemia  -Replace and recheck per RN managed protocol.     Canker sore  -Symptomatic management with PRN Orajel. Improved.      Indigestion  Noted on 8/19/23. Resolved with PRN Pepto-Bismol.  -Monitor.     Gender dysphoria in an adult  Female to male transition.  -Resumed PTA weekly testosterone 8/16/23 per patient request.  -Continue Truvada.     Diet: Regular Diet Adult    DVT Prophylaxis: Pneumatic Compression Devices and Ambulate every shift  Tierney Catheter: Not present  Lines: None     Cardiac Monitoring: None  Code Status: Full Code      Clinically Significant Risk Factors                                    Disposition Plan     Expected Discharge Date: 08/22/2023    Discharge Delays: *Medically Ready for Discharge  Placement - Mental Health/Addiction/Geripsych  Destination: [facility]  Discharge Comments: needs cd eval, inpt treatment          Elsie Garcia MD  Hospitalist Service  [facility]  Securely message with [facility] (more info)  Text page via [email or web address] Paging/Directory   ______________________________________________________________________    Interval History   Stable overnight.  Patient new to me today, chart reviewed.    Unfortunately, due to a series of unfortunate events elsewhere on my census, I was unable to see this patient in person today.  Looks like the main issue is chemical dependency placement, which I note that care coordination and social work are actively pursuing.    Physical Exam   Vital Signs: Temp: 97.6  F (36.4  C) Temp src: Oral BP: 110/56 Pulse: 50   Resp: 18 SpO2: 98 % O2 Device: None (Room air)    Weight: 138 lbs 14.24 oz    Did not examine today    Medical Decision Making       MANAGEMENT DISCUSSED with the following over the past 24 hours:     NOTE(S)/MEDICAL RECORDS REVIEWED over the past 24 hours:    Tests ORDERED & REVIEWED in the past 24 hours:  - See lab/imaging results included in the data section of the note      Data

## 2023-08-21 NOTE — PROGRESS NOTES
Care Management Follow Up    Length of Stay (days): 8    Expected Discharge Date: 08/22/2023     Concerns to be Addressed:       Patient plan of care discussed at interdisciplinary rounds: Yes    Anticipated Discharge Disposition: Inpatient Chemical Dependency     Anticipated Discharge Services:    Anticipated Discharge DME:      Patient/family educated on Medicare website which has current facility and service quality ratings:    Education Provided on the Discharge Plan:    Patient/Family in Agreement with the Plan:      Referrals Placed by CM/SW:    Private pay costs discussed: Not applicable    Additional Information:  [facility] and [facility] to [facility] d[facility]riy. ABIMAEL left a vm inquiring if there is any documentation they are needing from abimael.    Norman Orourke, MITZI    [facility]

## 2023-08-21 NOTE — PROGRESS NOTES
"SPIRITUAL HEALTH SERVICES Progress Note    [facility]    Saw pt Magy (\"Gregory\") R Tami per extended length of stay check-in.    Patient/Family Understanding of Illness and Goals of Care - In reflecting on the reason for this admission, Gregory noted that he arrived after \"overdosing on fentanyl.\" He noted that this was the second time this has happened and he reflected on the experience of receiving CPR. I offered support at the bedside today and I engaged Gregory in a time of reflective conversation surrounding this admission.     Distress and Loss - Right now, Gregory is very concerned about placement for CD treatment. He noted that he is transgender and had previously been to the [facility] for treatment. However, he noted that he had a bad experience there and does not wish to go back. He wants to discharge to treatment soon and knows that he will \"go back to using\" if he is discharged sooner and not to a treatment center.      Strengths, Coping, and Resources -   Supportive People: He names that he has a lot of supportive people around him at his sober house and he identified his mother as a helpful resource.   Coping Tools/Resource: rGegory named that he dislikes being in the hospital and feels trapped. He notes that it's calming for him to \"be at a lake\" or \"outside.\" However, he hasn't been able to do those things for the past 10+ days during his admission. He is having trouble coping with being \" from the world.\"  Spiritual Resources: Throughout our conversation, Gregory continuously named ways in which his Religious surjit provided him with strength. He stated that celebrating Shabbat is important to him and he requested that I say a prayer for him today. At the end of this visit, I offered the traditional Religious Mi Chata prayer for healing.     Meaning, Beliefs, and Spirituality -   Meaning: He is currently wondering about his \"purpose\" after surviving \"dying two different times.\" When reflecting on " "this, he believes his purpose might be to help other people going through addiction (like his late father did after \"being clean for 40 years\").  Spirituality: Gregory identifies strongly with his Scientologist surjit and reflected on how this intersects with his current illness. He has often wondered \"why God would allow me to come back\" and feels that his surjit provides him with a sense of peace.     Plan of Care -  support is greatly appreciated and Gregory is open to follow-up. I will continue to check-in while admitted to unit 66. Please consult as requested or as any additional needs arise.     ------  Kamari Coates M.Div.  Resident   [facility] Pager: [phone]  "

## 2023-08-21 NOTE — PLAN OF CARE
Goal Outcome Evaluation:  Summary: Unintentional Fentanyl overdose  DATE & TIME: 8/20/23 NOC  Cognitive Concerns/ Orientation: A&Ox4. Calm and cooperative.   BEHAVIOR & AGGRESSION TOOL COLOR: Green.  ABNL VS/O2: VSS on RA. VS q4h  MOBILITY: Independent  PAIN MANAGMENT: Scheduled tylenol, Voltaren cream  DIET: Regular  BOWEL/BLADDER: Continent of B&B.   ABNL LAB/BG: DRAIN/DEVICES: n/a  TELEMETRY RHYTHM: N/A  SKIN: Intact. Rash to RUE  TESTS/PROCEDURES: None.  D/C DAY/GOALS/PLACE: TBD   OTHER IMPORTANT INFO: Seizure precautions. SW has referrals out for inpatient treatment facilities. Patient is allergic to latex and he will rather use paper towels when putting on Voltaren cream.

## 2023-08-22 NOTE — DISCHARGE SUMMARY
[facility]  Hospitalist Discharge Summary      Date of Admission:  8/10/2023  Date of Discharge:  8/23/2023  Discharging Provider: Elsie Garcia MD  Discharge Service: Hospitalist Service    Discharge Diagnoses   Acute respiratory arrest secondary to fentanyl overdose unintentional   Fentanyl abuse  History of meth and cocaine abuse  Musculoskeletal chest pain  Epilepsy  Tobacco use disorder in recent remission    Clinically Significant Risk Factors          Follow-ups Needed After Discharge   Follow-up Appointments     Follow-up and recommended labs and tests       Follow up with primary care provider, Jazmine Rowe, within 7 days for   hospital follow- up.  No follow up labs or test are needed.          Unresulted Labs Ordered in the Past 30 Days of this Admission       No orders found from 7/11/2023 to 8/11/2023.        These results will be followed up by n/a    Discharge Disposition   Discharged to inpatient chemical dependency treatment facility in [facility]  Condition at discharge: Stable    Hospital Course   Gregory Garrett is a 23-year-old transgender male, history of meth and cocaine abuse, history of seizure disorder, bipolar disorder, PTSD, chemical dependency, recently in rehab and currently in outpatient treatment for his meth and cocaine abuse, started using fentanyl and was admitted with fentanyl overdose and neck respiratory failure.      Acute respiratory arrest secondary to fentanyl overdose unintentional   Fentanyl abuse  History of meth and cocaine abuse  Smoking fentanyl on the light rail when he overdosed, apparently received cardiopulmonary resuscitation and Narcan administration.  Mental status improved, but then recurrent somnolence and required multiple further doses of Narcan. Patient told prior hospitalist that his drugs of choice are meth and cocaine but has been sober and had inpatient treatment.  She started using fentanyl for the last 3 days only.   He was found intoxicated during one of the meeting for his chemical dependency. Admitted to ICU on narcan infusion. Able to be weaned off narcan infusion the day after admission. Transferred out of ICU on 8/11/23.  -Psychiatry and chemical dependency consulted  -He wants to go back to sober living. He does not want to go to inpatient psychiatry and does not meet their criteria for inpatient admission. He is also not holdable or committable at this point. Has been explained to the patient that he will remain at risk as long as he continues to use, and he has been encouraged to continue with pursuing recovery.   -Social work following, appreciate their assistance.  -Anticipate discharge to residential treatment facility tomorrow 8/23/2023     Musculoskeletal chest pain  Troponin elevation  Very mildly elevated troponin initial 19, was 68 and then 46. This is likely secondary to CPR, and unlikely secondary to ACS. Has musculoskeletal chest pain which is reproducible on exam. Echocardiogram checked - results were normal.  -Lidocaine patches discontinued 8/18/23 as they no longer seemed to be effective.  -Continue scheduled Tylenol 1000 mg 3 times a day.  -PRN ibuprofen and voltaren gel available.  -Avoid narcotics.     Epilepsy  Initial onset of seizures in 2022 following overdose.  Has followed with neurology as an outpatient and has an upcoming appointment with [facility] neurology. Was on Topamax and lamotrigine prior to admission. History of seizures the Monday prior to admission, mostly happen when he had been noncompliant with his medication. PTA lamotrigine discontinued initially due to concern that it contributed to increased suicidal ideation. Psychiatry re-consulted 8/16/23, lamotrigine was not felt to be causing adverse side effects, OK to restart lamotrigine in the future if needed. Discussed with neurology on 8/17/23, no plan to restart lamotrigine during this admission, recommend outpatient follow-up with his  Neurologist. Video EEG on 23 was normal.   -Seizure precautions.  -Continue PTA Topamax.     Passive suicidal ideation  Reports that he has no active suicidal or homicidal ideation, but was hoping that he would not be resuscitated if he .  -Psychiatry consulted, appreciate their assistance     Tobacco use disorder in recent remission  Quit smoking cigarettes 3 weeks ago.  Previously had been smoking since age 12 from 1 to 4 packs/day.  -Continue to encourage tobacco cessation/abstinence.     Hypophosphatemia, resolved    Indigestion, resolved     Gender dysphoria in an adult  Female to male transition.  -Resumed PTA weekly testosterone 23 per patient request.  -Continue Truvada.    Consultations This Hospital Stay   DIAGNOSTIC EVALUATION CENTER (DEC) ASSESSMENT ORDER  PSYCHIATRY IP CONSULT  NEUROLOGY IP CONSULT  PSYCHIATRY IP CONSULT  CARE MANAGEMENT / SOCIAL WORK IP CONSULT  CHEMICAL DEPENDENCY IP CONSULT  PSYCHIATRY IP CONSULT    Code Status   Full Code    Time Spent on this Encounter   I, Elsie Garcia MD, personally saw the patient today and spent greater than 30 minutes discharging this patient.       Elsie Garcia MD  [place] [address] [address]ICAL [address]  [facility] [place] [address]  Phone: [phone]  ______________________________________________________________________    Physical Exam   Vital Signs: Temp: 98.3  F (36.8  C) Temp src: Oral BP: 114/53 Pulse: 50   Resp: 15 SpO2: 98 % O2 Device: None (Room air)    Weight: 138 lbs 14.24 oz         Primary Care Physician   Jazmine Rowe    Discharge Orders      Reason for your hospital stay    Fentanyl overdose, opiate and methamphetamine addiction     Follow-up and recommended labs and tests     Follow up with primary care provider, Jazmine Rowe, within 7 days for hospital follow- up.  No follow up labs or test are needed.     Activity    Your activity upon discharge: activity as tolerated     Discharge  Instructions    Continue sober housing support and an addiction treatment program that meets your needs.     Full Code     Diet    Follow this diet upon discharge:       Regular Diet       Significant Results and Procedures   See Epic    Discharge Medications   Current Discharge Medication List        START taking these medications    Details   acetaminophen (TYLENOL) 500 MG tablet Take 2 tablets (1,000 mg) by mouth 3 times daily  Qty: 180 tablet, Refills: 0    Associated Diagnoses: Musculoskeletal chest pain      diclofenac (VOLTAREN) 1 % topical gel Apply 2 g topically 4 times daily  Qty: 100 g, Refills: 1    Associated Diagnoses: Musculoskeletal chest pain      ibuprofen (ADVIL/MOTRIN) 600 MG tablet Take 1 tablet (600 mg) by mouth every 6 hours as needed for inflammatory pain  Qty: 120 tablet, Refills: 0    Associated Diagnoses: Musculoskeletal chest pain           CONTINUE these medications which have CHANGED    Details   emtricitabine-tenofovir (TRUVADA) 200-300 MG per tablet Take 1 tablet by mouth daily at 2 pm  Qty: 30 tablet, Refills: 1    Associated Diagnoses: Transgender person on hormone therapy      testosterone cypionate (DEPOTESTOSTERONE) 100 MG/ML injection Inject 0.25 mLs (25 mg) into the muscle once a week  Qty: 1 mL, Refills: 1    Associated Diagnoses: Transgender person on hormone therapy      topiramate (TOPAMAX) 50 MG tablet Take 2 tablets (100 mg) by mouth 2 times daily  Qty: 60 tablet, Refills: 1    Associated Diagnoses: Bipolar 2 disorder (H)           STOP taking these medications       lamoTRIgine (LAMICTAL) 25 MG tablet Comments:   Reason for Stopping:             Allergies   Allergies   Allergen Reactions    Lactose Unknown    Latex      Nitrile gloves     Pork Allergy Unknown     Congregational    Gluten Meal

## 2023-08-22 NOTE — PLAN OF CARE
Goal Outcome Evaluation:      Plan of Care Reviewed With: patient    Overall Patient Progress: improvingOverall Patient Progress: improving     Goal Outcome Evaluation:  Summary: Unintentional Fentanyl overdose  DATE & TIME: 8/21-22/2023 [id number]  Cognitive Concerns/ Orientation: A&Ox4. Calm and cooperative.   BEHAVIOR & AGGRESSION TOOL COLOR: Green.  ABNL VS/O2: VSS on RA, bradycardic.    MOBILITY: Independent  PAIN MANAGMENT: Scheduled tylenol, Voltaren cream. Complaints of chest pain from CPR by EMT.  DIET: Regular  BOWEL/BLADDER: Continent of B&B.   ABNL LAB/BG: No labs today  DRAIN/DEVICES: n/a   TELEMETRY RHYTHM: N/A  SKIN: Intact.   TESTS/PROCEDURES: None.  D/C DAY/GOALS/PLACE: Medically ready for discharge pending Placement to MH/addiction  OTHER IMPORTANT INFO: Seizure precautions. SW has referrals out for inpatient treatment facilities. Patient is allergic to latex and prefers to put Voltaren gel on himself. Latex allergy

## 2023-08-22 NOTE — PROGRESS NOTES
Care Management Follow Up    Length of Stay (days): 9    Expected Discharge Date: 08/22/2023     Concerns to be Addressed:       Patient plan of care discussed at interdisciplinary rounds: No    Anticipated Discharge Disposition: Inpatient Chemical Dependency     Anticipated Discharge Services:    Anticipated Discharge DME:      Patient/family educated on Medicare website which has current facility and service quality ratings:    Education Provided on the Discharge Plan:    Patient/Family in Agreement with the Plan:      Referrals Placed by CM/SW:    Private pay costs discussed: Not applicable    Additional Information:  Writer spoke with colleague Norman regarding pt about attempt to locate placement for pt. Writer placed phone call to [phone]  [facility] and spoke with Intake. Writer was informed by [facility] that she would have to transfer writer to clinical assessment reviewer Lui. Writer was transferred to Lui. No answer. Writer left voicemail asking for call back regarding pt and what information may be needed.   Addendum: Writer updated attending doctor of attempt to make contact with facility. Dr. Garcia is requesting that writer reach out to CD counselor to see if she is able to assist. Writer reaching out to STEPHAN[facility].     Addendum: Writer was informed by STEPHANIE Richmond that she spoke with Radha from [facility]   [phone] who wanted to talk to writer. Writer placed call to Radha with [facility]. Radha is supervisor and states she will reach out to [facility]ui about finishing up pt's assessment. Writer spoke to colleague Norman who states she received a phone  call from [facility] stating pt was accepted and writer would receive a phone call from intake regarding the location options.  Writer later received call from Veena with [facility] [phone] . Veena states they have accepted [facility] to [facility] and is wanting to provide location options. Writer met with pt at bedside and placed Veena on speaker so that she   could talk to writer and pt at the same time. Veena informed pt that the only locations with openings are [facility] in [place] and [facility] in [place]. [facility] [place] but is informed by Veena that there is no opening there until sometime in September therefore pt only has the option of [facility] or [place] facility. Pt called a support person and had a brief discussion with them while writer spoke with Veena. Pt then states that he would like the [facility]. Veena states that they can pick pt up from hospital and transport him to the facility in [facility].Pt is agreeable to this. Veena states she will work on transport for pt for tomorrow therefore pt will not discharge from hospital until tomorrow. Pt states he is ok with this. Writer informed pt that writer will update him if writer hears back from [facility] regarding transport time but if does not hear by 4pm today will have to update him tomorrow. Pt states understanding. Pt states gratitude for assistance toady and states no further questions or concerns.   Writer updated doctor of discussion with [facility] and ability for them to take pt to [facility] tomorrow.  Writer informed doctor of unsure of what transport time will be.     Nichole Doran, BSW  Social Work  [facility]

## 2023-08-22 NOTE — PLAN OF CARE
Goal Outcome Evaluation:      Plan of Care Reviewed With: patient    Overall Patient Progress: no changeOverall Patient Progress: no change         Summary: Unintentional Fentanyl overdose  DATE & TIME: 8/21-22/2023 [id number]  Cognitive Concerns/ Orientation: A&Ox4, coop, sl anxious re: waiting for discharge plans.  BEHAVIOR & AGGRESSION TOOL COLOR: Green.  ABNL VS/O2: VSS on RA, HR lyndsay 40's,     MOBILITY: Ind, up ad masoud in room.   PAIN MANAGMENT: Scheduled tylenol, Voltaren cream for c/o chest pain from CPR prior to arrival. Pt is allergic to latex and prefers to put Voltaren gel on himself.   DIET: Regular, good appetite.  BOWEL/BLADDER: Continent of B&B.   ABNL LAB: None   DRAIN/DEVICES: None  SKIN: Intact.   TESTS/PROCEDURES: None.  D/C DAY/GOALS/PLACE: Medically ready for discharge pending placement to MH/addiction, SW involved.  OTHER IMPORTANT INFO: Seizure precautions. Content in room.

## 2023-08-22 NOTE — PROGRESS NOTES
Care Management Follow Up    Length of Stay (days): 9    Expected Discharge Date: 08/23/2023     Concerns to be Addressed:       Patient plan of care discussed at interdisciplinary rounds: Yes    Anticipated Discharge Disposition: Inpatient Chemical Dependency     Anticipated Discharge Services:    Anticipated Discharge DME:      Patient/family educated on Medicare website which has current facility and service quality ratings:    Education Provided on the Discharge Plan:    Patient/Family in Agreement with the Plan:      Referrals Placed by CM/SW:    Private pay costs discussed: Not applicable    Additional Information:  SW received call from [facility] indicating patient has been accepted and Sw will be hearing from an intake working to arrange an intake day/time,.    MITZI Hoskins    [facility]

## 2023-08-22 NOTE — PROGRESS NOTES
[facility]  Medicine Progress Note - Hospitalist Service  Date of Admission:  8/10/2023    Assessment & Plan   Gregory Garrett is a 23-year-old transgender male, history of meth and cocaine abuse, history of seizure disorder, bipolar disorder, PTSD, chemical dependency, recently in rehab and currently in outpatient treatment for his meth and cocaine abuse, started using fentanyl and was admitted with fentanyl overdose and neck respiratory failure.      Acute respiratory arrest secondary to fentanyl overdose unintentional   Fentanyl abuse  History of meth and cocaine abuse  Smoking fentanyl on the light rail when he overdosed, apparently received cardiopulmonary resuscitation and Narcan administration.  Mental status improved, but then recurrent somnolence and required multiple further doses of Narcan. Patient told prior hospitalist that his drugs of choice are meth and cocaine but has been sober and had inpatient treatment.  She started using fentanyl for the last 3 days only.  He was found intoxicated during one of the meeting for his chemical dependency. Admitted to ICU on narcan infusion. Able to be weaned off narcan infusion the day after admission. Transferred out of ICU on 8/11/23.  -Psychiatry and chemical dependency consulted  -He wants to go back to sober living. He does not want to go to inpatient psychiatry and does not meet their criteria for inpatient admission. He is also not holdable or committable at this point. Has been explained to the patient that he will remain at risk as long as he continues to use, and he has been encouraged to continue with pursuing recovery.   -Social work following, appreciate their assistance.  -Anticipate discharge to residential treatment facility tomorrow 8/23/2023     Musculoskeletal chest pain  Troponin elevation  Very mildly elevated troponin initial 19, was 68 and then 46. This is likely secondary to CPR, and unlikely secondary to ACS. Has  musculoskeletal chest pain which is reproducible on exam. Echocardiogram checked - results were normal.  -Lidocaine patches discontinued 23 as they no longer seemed to be effective.  -Continue scheduled Tylenol 1000 mg 3 times a day.  -PRN ibuprofen and voltaren gel available.  -Avoid narcotics.     Epilepsy  Initial onset of seizures in  following overdose.  Has followed with neurology as an outpatient and has an upcoming appointment with [facility]. Was on Topamax and lamotrigine prior to admission. History of seizures the Monday prior to admission, mostly happen when he had been noncompliant with his medication. PTA lamotrigine discontinued initially due to concern that it contributed to increased suicidal ideation. Psychiatry re-consulted 23, lamotrigine was not felt to be causing adverse side effects, OK to restart lamotrigine in the future if needed. Discussed with neurology on 23, no plan to restart lamotrigine during this admission, recommend outpatient follow-up with his Neurologist. Video EEG on 23 was normal.   -Seizure precautions.  -Continue PTA Topamax.     Passive suicidal ideation  Reports that he has no active suicidal or homicidal ideation, but was hoping that he would not be resuscitated if he .  -Psychiatry consulted, appreciate their assistance  -Suicide precautions discontinued     Tobacco use disorder in recent remission  Quit smoking cigarettes 3 weeks ago.  Previously had been smoking since age 12 from 1 to 4 packs/day.  -Continue to encourage tobacco cessation/abstinence.     Hypophosphatemia  -Replace and recheck per RN managed protocol.     Canker sore  -Symptomatic management with PRN Orajel. Improved.     Indigestion  Noted on 23. Resolved with PRN Pepto-Bismol.  -Monitor.     Gender dysphoria in an adult  Female to male transition.  -Resumed PTA weekly testosterone 23 per patient request.  -Continue Truvada.       Diet: Regular Diet Adult  Diet     DVT Prophylaxis: Pneumatic Compression Devices and Ambulate every shift  Tierney Catheter: Not present  Lines: None     Cardiac Monitoring: None  Code Status: Full Code      Clinically Significant Risk Factors                                    Disposition Plan      Expected Discharge Date: 08/23/2023    Discharge Delays: *Medically Ready for Discharge  Placement - Mental Health/Addiction/Geripsych  Destination: [facility]  Discharge Comments: needs cd eval, inpt treatment          Elsie Garcia MD  Hospitalist Service  [facility]  Securely message with [employer] (more info)  Text page via [employer] Paging/Directory   ______________________________________________________________________    Interval History   No new complaints. Still working on chem dep placement to residential facility.     Eating and drinking ok. Really motivated for treatment.     Physical Exam   Vital Signs: Temp: 98.2  F (36.8  C) Temp src: Oral BP: 112/56 Pulse: (!) 45   Resp: 18 SpO2: 99 % O2 Device: None (Room air)    Weight: 138 lbs 14.24 oz    General Appearance: Alert, NAD   Respiratory:  nonlabored, no audible wheezing  Skin: Numerous tattoos present, no acute rashes  Neuro/psych: No tremors, normal gait.  Calm mood    Medical Decision Making       MANAGEMENT DISCUSSED with the following over the past 24 hours: Patient, bedside nurse, social work   NOTE(S)/MEDICAL RECORDS REVIEWED over the past 24 hours: Social work notes, nursing notes, prior rounding notes       Data

## 2023-08-22 NOTE — PLAN OF CARE
Goal Outcome Evaluation:  Goal Outcome Evaluation:  Summary: Unintentional Fentanyl overdose  DATE & TIME: 8/21/23 [id number]  Cognitive Concerns/ Orientation: A&Ox4. Calm and cooperative.   BEHAVIOR & AGGRESSION TOOL COLOR: Green.  ABNL VS/O2: VSS on RA,   MOBILITY: Independent  PAIN MANAGMENT: Scheduled tylenol, Voltaren cream. Complaints of chest pain from CPR by EMT.  DIET: Regular  BOWEL/BLADDER: Continent of B&B.   ABNL LAB/BG: No labs today  DRAIN/DEVICES: n/a   TELEMETRY RHYTHM: N/A  SKIN: Intact.   TESTS/PROCEDURES: None.  D/C DAY/GOALS/PLACE: Medically ready for discharge pending Placement to MH/addiction  OTHER IMPORTANT INFO: Seizure precautions. SW has referrals out for inpatient treatment facilities. Patient is allergic to latex and prefers to put Voltaren gel on himself

## 2023-08-23 NOTE — PLAN OF CARE
Goal Outcome Evaluation:    Orientation: AOX4  ABNL VS/O2: VSS on RA, HR lyndsay    Tele: n/a  IV Access/drains: None  Diet: regular  ABNL LAB: None   TESTS/PROCEDURES: None.  PAIN MANAGMENT: No c/o pain this shift  Mobility: Independent in room  GI/: Continent of B&B.   Wound/Skin: Intact  Consults: SW following  Discharge Plan: Pending placement to MH/addiction.  Other Important info: Pt is allergic to latex and prefers to put Voltaren gel on himself.

## 2023-08-23 NOTE — PROGRESS NOTES
Care Management Discharge Note    Discharge Date: 08/23/2023       Discharge Disposition: Inpatient Chemical Dependency    Discharge Services:  To [facility]    Discharge DME:      Discharge Transportation:  [facility] will be having a  pick pt up.     Private pay costs discussed: Not applicable    Does the patient's insurance plan have a 3 day qualifying hospital stay waiver?  No    PAS Confirmation Code:    Patient/family educated on Medicare website which has current facility and service quality ratings:      Education Provided on the Discharge Plan:    Persons Notified of Discharge Plans: Bedside RN, HUC, pt, and doctor.  Patient/Family in Agreement with the Plan:  Yes    Handoff Referral Completed: No    Additional Information:  Writer spoke with pt's bedside RN. Bedside RN states that she spoke with [facility] and was informed that they would be arriving for pt shortly this morning to transport him to [place]. Bedside RN states they informed her of what they needed regarding medications and discharge plan. Bedside RN states she informed pt of the fact that transport and that it will not stop by his previous living arrangement for clothing. Therefore, pt is having someone drop off some clothes for him. Bedside RN states [facility]  did not inform her of any other needs or concerns.  Writer phone call to [facility] and asked to speak to Veena. Writer was informed by reception that they would relay the message to Veena that writer would like a call back if any needs for pt.  Writer met with pt at bedside. Pt states he was informed of discharge this morning. Pt is in a good mood and states gratitude for assistance and is glad to be leaving to [facility]. Pt states no questions or concerns at this time.  Writer updated doctor of discharge today. Writer updated HUC of discharge and asked for an extra packet to be printed and sent with pt.  Writer received voicemail  from Veena with [facility] stating plan is for 11:00  and they spoke to nurse about this. Veena states pt is all set to go and no needs.    States working with nurse regarding medications and getting pt set to discharge.     Nichole Doran, BSW  Social Work  [facility]

## 2023-08-23 NOTE — PLAN OF CARE
Goal Outcome Evaluation:      Plan of Care Reviewed With: patient    Overall Patient Progress: improvingOverall Patient Progress: improving         Summary: Unintentional Fentanyl overdose    DATE & TIME: 8/23/2023   Cognitive Concerns/ Orientation: A&Ox4, coop.   BEHAVIOR & AGGRESSION TOOL COLOR: Green.  ABNL VS/O2: VSS on RA, HR lyndsay 50's,     MOBILITY: Ind, up ad masoud in room.   PAIN MANAGMENT: Scheduled tylenol, Voltaren cream for c/o chest pain from CPR prior to arrival. Pt is allergic to latex and prefers to put Voltaren gel on himself.   DIET: Regular, good appetite.  BOWEL/BLADDER: Continent of B&B.   ABNL LAB: None   DRAIN/DEVICES: None  SKIN: Intact.   TESTS/PROCEDURES: None.  D/C DAY/GOALS/PLACE: Today to [facility] in [place], will provide transportation.  OTHER IMPORTANT INFO: Seizure precautions. Content in room.     Discharge    Patient discharged to [facility] via provided transportation with their staff.     Listed belongings gathered and given to patient (including from security/pharmacy). Yes  Care Plan and Patient education resolved: Yes  Prescriptions if needed, hard copies sent with patient  Yes  Medication Bin checked and emptied on discharge Yes  SW/care coordinator/charge RN aware of discharge: Yes

## 2023-08-23 NOTE — PLAN OF CARE
Goal Outcome Evaluation:      Plan of Care Reviewed With: patient    Overall Patient Progress: improvingOverall Patient Progress: improving       Summary Unintentional Fentanyl overdose     8/22/2023 [id number]    Orientation A & O x 4    Vitals/Tele VSS on RA, bradycardic non symptomatic    IV Access/drains     Diet Reg good apetite    Mobility Ind up ad masoud in room     GI/ Continent of B/B pt reported BM today    Wound/Skin  Patient showered this shift. Patient is allergic to latex and prefers to put Voltaren gel on himself. Latex allergy       Pain managed with scheduled tylenol     Discharge Plan Tomorrow to inpatient treatment in [place]. Pt is looking forward to discharging but expressed some anxiety. Pt has been frustrated that the placements took along time and was considering leaving AMA.       See Flow sheets for assessment

## 2023-08-24 NOTE — PROGRESS NOTES
[facility]    Background: Transitional Care Management program identified per system criteria and reviewed by [facility] team for possible outreach.    Assessment: Upon chart review, CCR Team member will not proceed with patient outreach related to this episode of Transitional Care Management program due to reason below:    Patient has discharged to a Memory Care, Long-term Care, Assisted Living or Group Home where patient is receiving on-site support with their daily cares, including support with hospital follow up plan.  inpatient chemical dependency treatment facility in [facility]     Plan: Transitional Care Management episode addressed appropriately per reason noted above.      Adrianne Li MA  [facility], [facility]    *Connected Care Resource Team does NOT follow patient ongoing. Referrals are identified based on internal discharge reports and the outreach is to ensure patient has an understanding of their discharge instructions.